# Patient Record
Sex: FEMALE | Race: WHITE | Employment: OTHER | URBAN - METROPOLITAN AREA
[De-identification: names, ages, dates, MRNs, and addresses within clinical notes are randomized per-mention and may not be internally consistent; named-entity substitution may affect disease eponyms.]

---

## 2022-08-13 ENCOUNTER — APPOINTMENT (OUTPATIENT)
Dept: CT IMAGING | Age: 67
DRG: 338 | End: 2022-08-13
Payer: MEDICARE

## 2022-08-13 ENCOUNTER — APPOINTMENT (OUTPATIENT)
Dept: GENERAL RADIOLOGY | Age: 67
DRG: 338 | End: 2022-08-13
Payer: MEDICARE

## 2022-08-13 ENCOUNTER — HOSPITAL ENCOUNTER (INPATIENT)
Age: 67
LOS: 6 days | Discharge: HOME | DRG: 338 | End: 2022-08-19
Attending: EMERGENCY MEDICINE | Admitting: SURGERY
Payer: MEDICARE

## 2022-08-13 DIAGNOSIS — K35.32 PERFORATED APPENDIX: Primary | ICD-10-CM

## 2022-08-13 DIAGNOSIS — K35.20 ACUTE APPENDICITIS WITH GENERALIZED PERITONITIS, UNSPECIFIED WHETHER ABSCESS PRESENT, UNSPECIFIED WHETHER GANGRENE PRESENT, UNSPECIFIED WHETHER PERFORATION PRESENT: ICD-10-CM

## 2022-08-13 DIAGNOSIS — R11.0 NAUSEA: ICD-10-CM

## 2022-08-13 DIAGNOSIS — K65.1 INTRA-ABDOMINAL ABSCESS (HCC): ICD-10-CM

## 2022-08-13 PROBLEM — R10.9 PAIN IN THE ABDOMEN: Status: ACTIVE | Noted: 2022-08-13

## 2022-08-13 LAB
ALBUMIN SERPL-MCNC: 4.2 GM/DL (ref 3.4–5)
ALP BLD-CCNC: 466 IU/L (ref 40–129)
ALT SERPL-CCNC: 99 U/L (ref 10–40)
ANION GAP SERPL CALCULATED.3IONS-SCNC: 13 MMOL/L (ref 4–16)
AST SERPL-CCNC: 92 IU/L (ref 15–37)
BASOPHILS ABSOLUTE: 0.1 K/CU MM
BASOPHILS RELATIVE PERCENT: 0.4 % (ref 0–1)
BILIRUB SERPL-MCNC: 0.6 MG/DL (ref 0–1)
BUN BLDV-MCNC: 15 MG/DL (ref 6–23)
CALCIUM SERPL-MCNC: 9.8 MG/DL (ref 8.3–10.6)
CHLORIDE BLD-SCNC: 98 MMOL/L (ref 99–110)
CO2: 27 MMOL/L (ref 21–32)
CREAT SERPL-MCNC: 0.9 MG/DL (ref 0.6–1.1)
D DIMER: 3437 NG/ML(DDU)
DIFFERENTIAL TYPE: ABNORMAL
EKG ATRIAL RATE: 107 BPM
EKG DIAGNOSIS: NORMAL
EKG P AXIS: 63 DEGREES
EKG P-R INTERVAL: 144 MS
EKG Q-T INTERVAL: 320 MS
EKG QRS DURATION: 84 MS
EKG QTC CALCULATION (BAZETT): 427 MS
EKG R AXIS: 53 DEGREES
EKG T AXIS: 33 DEGREES
EKG VENTRICULAR RATE: 107 BPM
EOSINOPHILS ABSOLUTE: 0 K/CU MM
EOSINOPHILS RELATIVE PERCENT: 0.2 % (ref 0–3)
GFR AFRICAN AMERICAN: >60 ML/MIN/1.73M2
GFR NON-AFRICAN AMERICAN: >60 ML/MIN/1.73M2
GLUCOSE BLD-MCNC: 123 MG/DL (ref 70–99)
HCT VFR BLD CALC: 45.1 % (ref 37–47)
HEMOGLOBIN: 14.6 GM/DL (ref 12.5–16)
IMMATURE NEUTROPHIL %: 0.5 % (ref 0–0.43)
LACTATE: 1.4 MMOL/L (ref 0.4–2)
LIPASE: 13 IU/L (ref 13–60)
LYMPHOCYTES ABSOLUTE: 1.2 K/CU MM
LYMPHOCYTES RELATIVE PERCENT: 7.7 % (ref 24–44)
MCH RBC QN AUTO: 30.8 PG (ref 27–31)
MCHC RBC AUTO-ENTMCNC: 32.4 % (ref 32–36)
MCV RBC AUTO: 95.1 FL (ref 78–100)
MONOCYTES ABSOLUTE: 1.4 K/CU MM
MONOCYTES RELATIVE PERCENT: 9.2 % (ref 0–4)
NUCLEATED RBC %: 0 %
PDW BLD-RTO: 13.6 % (ref 11.7–14.9)
PLATELET # BLD: 337 K/CU MM (ref 140–440)
PMV BLD AUTO: 9.8 FL (ref 7.5–11.1)
POTASSIUM SERPL-SCNC: 4.1 MMOL/L (ref 3.5–5.1)
RBC # BLD: 4.74 M/CU MM (ref 4.2–5.4)
SARS-COV-2, NAAT: NOT DETECTED
SEGMENTED NEUTROPHILS ABSOLUTE COUNT: 12.4 K/CU MM
SEGMENTED NEUTROPHILS RELATIVE PERCENT: 82 % (ref 36–66)
SODIUM BLD-SCNC: 138 MMOL/L (ref 135–145)
SOURCE: NORMAL
TOTAL IMMATURE NEUTOROPHIL: 0.07 K/CU MM
TOTAL NUCLEATED RBC: 0 K/CU MM
TOTAL PROTEIN: 7.7 GM/DL (ref 6.4–8.2)
TROPONIN T: <0.01 NG/ML
WBC # BLD: 15.1 K/CU MM (ref 4–10.5)

## 2022-08-13 PROCEDURE — 85025 COMPLETE CBC W/AUTO DIFF WBC: CPT

## 2022-08-13 PROCEDURE — 96375 TX/PRO/DX INJ NEW DRUG ADDON: CPT

## 2022-08-13 PROCEDURE — 87040 BLOOD CULTURE FOR BACTERIA: CPT

## 2022-08-13 PROCEDURE — 84484 ASSAY OF TROPONIN QUANT: CPT

## 2022-08-13 PROCEDURE — 1200000000 HC SEMI PRIVATE

## 2022-08-13 PROCEDURE — 71275 CT ANGIOGRAPHY CHEST: CPT

## 2022-08-13 PROCEDURE — 71046 X-RAY EXAM CHEST 2 VIEWS: CPT

## 2022-08-13 PROCEDURE — 2580000003 HC RX 258: Performed by: EMERGENCY MEDICINE

## 2022-08-13 PROCEDURE — 87150 DNA/RNA AMPLIFIED PROBE: CPT

## 2022-08-13 PROCEDURE — 6370000000 HC RX 637 (ALT 250 FOR IP): Performed by: NURSE PRACTITIONER

## 2022-08-13 PROCEDURE — 6360000002 HC RX W HCPCS: Performed by: EMERGENCY MEDICINE

## 2022-08-13 PROCEDURE — 93005 ELECTROCARDIOGRAM TRACING: CPT | Performed by: NURSE PRACTITIONER

## 2022-08-13 PROCEDURE — 87635 SARS-COV-2 COVID-19 AMP PRB: CPT

## 2022-08-13 PROCEDURE — 74177 CT ABD & PELVIS W/CONTRAST: CPT

## 2022-08-13 PROCEDURE — 80053 COMPREHEN METABOLIC PANEL: CPT

## 2022-08-13 PROCEDURE — C9113 INJ PANTOPRAZOLE SODIUM, VIA: HCPCS | Performed by: EMERGENCY MEDICINE

## 2022-08-13 PROCEDURE — 96374 THER/PROPH/DIAG INJ IV PUSH: CPT

## 2022-08-13 PROCEDURE — 83690 ASSAY OF LIPASE: CPT

## 2022-08-13 PROCEDURE — 83605 ASSAY OF LACTIC ACID: CPT

## 2022-08-13 PROCEDURE — 93010 ELECTROCARDIOGRAM REPORT: CPT | Performed by: INTERNAL MEDICINE

## 2022-08-13 PROCEDURE — 85379 FIBRIN DEGRADATION QUANT: CPT

## 2022-08-13 PROCEDURE — 6360000004 HC RX CONTRAST MEDICATION: Performed by: EMERGENCY MEDICINE

## 2022-08-13 PROCEDURE — 99285 EMERGENCY DEPT VISIT HI MDM: CPT

## 2022-08-13 PROCEDURE — 96361 HYDRATE IV INFUSION ADD-ON: CPT

## 2022-08-13 RX ORDER — ONDANSETRON 2 MG/ML
4 INJECTION INTRAMUSCULAR; INTRAVENOUS EVERY 6 HOURS PRN
Status: DISCONTINUED | OUTPATIENT
Start: 2022-08-13 | End: 2022-08-19 | Stop reason: HOSPADM

## 2022-08-13 RX ORDER — ASPIRIN 81 MG/1
324 TABLET, CHEWABLE ORAL ONCE
Status: COMPLETED | OUTPATIENT
Start: 2022-08-13 | End: 2022-08-13

## 2022-08-13 RX ORDER — ACETAMINOPHEN 325 MG/1
650 TABLET ORAL EVERY 4 HOURS PRN
Status: DISCONTINUED | OUTPATIENT
Start: 2022-08-13 | End: 2022-08-19 | Stop reason: HOSPADM

## 2022-08-13 RX ORDER — 0.9 % SODIUM CHLORIDE 0.9 %
1000 INTRAVENOUS SOLUTION INTRAVENOUS ONCE
Status: COMPLETED | OUTPATIENT
Start: 2022-08-13 | End: 2022-08-13

## 2022-08-13 RX ORDER — SODIUM CHLORIDE 0.9 % (FLUSH) 0.9 %
5-40 SYRINGE (ML) INJECTION EVERY 12 HOURS SCHEDULED
Status: DISCONTINUED | OUTPATIENT
Start: 2022-08-13 | End: 2022-08-19 | Stop reason: HOSPADM

## 2022-08-13 RX ORDER — PANTOPRAZOLE SODIUM 40 MG/10ML
40 INJECTION, POWDER, LYOPHILIZED, FOR SOLUTION INTRAVENOUS ONCE
Status: COMPLETED | OUTPATIENT
Start: 2022-08-13 | End: 2022-08-13

## 2022-08-13 RX ORDER — SODIUM CHLORIDE 9 MG/ML
INJECTION, SOLUTION INTRAVENOUS PRN
Status: DISCONTINUED | OUTPATIENT
Start: 2022-08-13 | End: 2022-08-19 | Stop reason: HOSPADM

## 2022-08-13 RX ORDER — MORPHINE SULFATE 4 MG/ML
4 INJECTION, SOLUTION INTRAMUSCULAR; INTRAVENOUS EVERY 4 HOURS PRN
Status: DISCONTINUED | OUTPATIENT
Start: 2022-08-13 | End: 2022-08-14

## 2022-08-13 RX ORDER — SODIUM CHLORIDE 0.9 % (FLUSH) 0.9 %
5-40 SYRINGE (ML) INJECTION PRN
Status: DISCONTINUED | OUTPATIENT
Start: 2022-08-13 | End: 2022-08-19 | Stop reason: HOSPADM

## 2022-08-13 RX ORDER — MORPHINE SULFATE 4 MG/ML
4 INJECTION, SOLUTION INTRAMUSCULAR; INTRAVENOUS ONCE
Status: DISCONTINUED | OUTPATIENT
Start: 2022-08-13 | End: 2022-08-13

## 2022-08-13 RX ORDER — ONDANSETRON 2 MG/ML
4 INJECTION INTRAMUSCULAR; INTRAVENOUS EVERY 30 MIN PRN
Status: DISCONTINUED | OUTPATIENT
Start: 2022-08-13 | End: 2022-08-13

## 2022-08-13 RX ORDER — SODIUM CHLORIDE, SODIUM LACTATE, POTASSIUM CHLORIDE, CALCIUM CHLORIDE 600; 310; 30; 20 MG/100ML; MG/100ML; MG/100ML; MG/100ML
INJECTION, SOLUTION INTRAVENOUS CONTINUOUS
Status: DISCONTINUED | OUTPATIENT
Start: 2022-08-13 | End: 2022-08-18

## 2022-08-13 RX ORDER — ONDANSETRON 4 MG/1
4 TABLET, ORALLY DISINTEGRATING ORAL EVERY 8 HOURS PRN
Status: DISCONTINUED | OUTPATIENT
Start: 2022-08-13 | End: 2022-08-19 | Stop reason: HOSPADM

## 2022-08-13 RX ADMIN — PIPERACILLIN AND TAZOBACTAM 3375 MG: 3; .375 INJECTION, POWDER, LYOPHILIZED, FOR SOLUTION INTRAVENOUS at 15:17

## 2022-08-13 RX ADMIN — SODIUM CHLORIDE 1000 ML: 9 INJECTION, SOLUTION INTRAVENOUS at 12:42

## 2022-08-13 RX ADMIN — IOPAMIDOL 80 ML: 755 INJECTION, SOLUTION INTRAVENOUS at 14:12

## 2022-08-13 RX ADMIN — ASPIRIN 81 MG CHEWABLE TABLET 324 MG: 81 TABLET CHEWABLE at 12:42

## 2022-08-13 RX ADMIN — ONDANSETRON 4 MG: 2 INJECTION INTRAMUSCULAR; INTRAVENOUS at 12:43

## 2022-08-13 RX ADMIN — PANTOPRAZOLE SODIUM 40 MG: 40 INJECTION, POWDER, FOR SOLUTION INTRAVENOUS at 12:43

## 2022-08-13 ASSESSMENT — ENCOUNTER SYMPTOMS
SORE THROAT: 0
STRIDOR: 0
EYE PAIN: 0
COUGH: 0
DIARRHEA: 0
RHINORRHEA: 0
APNEA: 0
BLOOD IN STOOL: 0
CONSTIPATION: 1
VOMITING: 0
CHEST TIGHTNESS: 0
ABDOMINAL PAIN: 1
NAUSEA: 1
FACIAL SWELLING: 0
SHORTNESS OF BREATH: 0
WHEEZING: 0
EYE DISCHARGE: 0
BACK PAIN: 0
COLOR CHANGE: 0
EYE REDNESS: 0
ABDOMINAL DISTENTION: 0

## 2022-08-13 ASSESSMENT — PAIN DESCRIPTION - FREQUENCY: FREQUENCY: CONTINUOUS

## 2022-08-13 ASSESSMENT — LIFESTYLE VARIABLES: HOW OFTEN DO YOU HAVE A DRINK CONTAINING ALCOHOL: NEVER

## 2022-08-13 ASSESSMENT — PAIN DESCRIPTION - ORIENTATION: ORIENTATION: LOWER;RIGHT

## 2022-08-13 ASSESSMENT — PAIN DESCRIPTION - LOCATION: LOCATION: ABDOMEN

## 2022-08-13 ASSESSMENT — PAIN SCALES - GENERAL: PAINLEVEL_OUTOF10: 4

## 2022-08-13 ASSESSMENT — PAIN DESCRIPTION - PAIN TYPE: TYPE: ACUTE PAIN

## 2022-08-13 NOTE — ED PROVIDER NOTES
I independently examined and evaluated Sherice Painter. In brief their history revealed patient states she has had a lot of nausea dry heaving and some abdominal pain. Recently saw her PCP who put her on Phenergan. States she took a couple doses but felt like she did not like the way it made her feel and did not help with her nausea. She is denying any chest pain, shortness of breath, productive cough. Denies any sick contacts. States she is having some difficulty having a bowel movement and also feels nauseated. She has had a previous total abdominal hysterectomy and lysis of adhesions due to a bowel obstruction in the past.  Denies any fevers, chills, urinary symptoms. Their exam revealed female who is awake, alert, oriented x4. Soft abdomen. Nondistended. No guarding or rebound. Mild mid abdominal tenderness. Clear lungs. Speaks in full sentences. No pitting edema. . All diagnostic, treatment, and disposition decisions were made by myself in conjunction with the mid-level provider. Before disposition, questions were sought and answered with the patient. They have voiced understanding and agree with the plan: Patient is normotensive, afebrile, sats 99% on room air. Patient is mildly tachycardic. Patient was started on IV fluids, Zofran, Protonix IV. EKG shows sinus tachycardia without any acute findings. CBC shows a white count of 15.1, hemoglobin of 14. 6. Electrolytes show mild elevation in LFTs with an ALT of 99, AST of 92, alk phos of 466. Normal anion gap of 13. Lipase normal at 13. Troponin normal.  D-dimer is elevated at 3437. Rapid covid negative. Chest x-ray shows no acute process. CTA chest no acute pulmonary emboli or pulmonary process. COPD. Shital Abbott CT abdomen pelvis right lower quadrant pelvic 8.2 cm complex air-fluid collection concerning for abscess. Focal right lower quadrant inflammatory changes.   This is concerning to represent an acute perforated appendicitis with secondary cecal and terminal ileal inflammation. No evidence of bowel obstruction. Patient started on morphine for pain, Zosyn IV. Lactic and cultures were added. General surgery will be contacted. Per chart review patient has seen Dr. Giacomo Ferrera in the past, will place call to his general surgery group. NP, [facility] spoke with Dr Garcia Kearney who will consult, possible IR drainage versus surgery, Dr Garcia Kearney to decide. He agrees with abx, admit to hospitalist. Annita Marquez with patient, she agrees to plan. For all further details of the patient's emergency department visit, please see the mid-level practitioner's documentation. The Ekg interpreted by me shows  sinus tachycardia, tkit=907  with a rate of 107  Axis is   Normal  QTc is  normal  Intervals and Durations are unremarkable.       ST Segments: no acute change  No old EKG           Kasia Franco MD  08/13/22 [id number]

## 2022-08-13 NOTE — ED NOTES
States yesterday she has right lower abdominal pain, after taking the Promethazine     Marguerite Mirtha  08/13/22 0239

## 2022-08-13 NOTE — ED NOTES
[id number] paged hospitalist     Amanda Ulrich  08/13/22 189 [address] returned call      Amanda Ulrich  08/13/22 9581

## 2022-08-13 NOTE — ED PROVIDER NOTES
[facility]  EMERGENCY DEPARTMENT ENCOUNTER        Pt Name: Davy Hou  MRN: [id number]  [place] 1955  Date of evaluation: 8/13/2022  Provider: PJ Keith - HARSH  PCP: Estrada García MD  Note Started: 12:17 PM EDT       I have seen and evaluated this patient with my supervising physician Dre Louise MD.      Triage CHIEF COMPLAINT       Chief Complaint   Patient presents with    Other     States has had indigestion for 1 week. States that her Dr gave her Promethazine on 8/10/2022, without relief    Abdominal Pain     Right lower abdominal pain         HISTORY OF PRESENT ILLNESS   (Location/Symptom, Timing/Onset, Context/Setting, Quality, Duration, Modifying Factors, Severity)  Note limiting factors. Chief Complaint: Abdominal pain/fullness    Davy Hou is a 69 y.o. female comes to the emergency department with vague symptoms including abdominal fullness that mostly is in the epigastric/suprapubic, supraumbilical aspect of the abdomen. Is been ongoing for several days. She was seen by her primary care provider and was treated with promethazine however this has not helped her symptoms and just serve to make her tired and grouchy. She also reports that she has had a mild intermittent headache. There is associated symptoms include dysuria, anorexia, general malaise. She reports the pain currently is a 5 out of 10. She reports that she is generally just been feeling miserable. Patient is passing gas. She admits that its been several days since she had a bowel movement however has not been eating very much either. Denies any relieving or exacerbating factors. Nursing Notes were all reviewed and agreed with or any disagreements were addressed in the HPI. REVIEW OF SYSTEMS    (2-9 systems for level 4, 10 or more for level 5)     Review of Systems   Constitutional:  Negative for appetite change, chills, fatigue, fever and unexpected weight change.    HENT: Negative for congestion, ear pain, facial swelling, hearing loss, rhinorrhea and sore throat. Eyes:  Negative for pain, discharge, redness and visual disturbance. Respiratory:  Negative for apnea, cough, chest tightness, shortness of breath, wheezing and stridor. Cardiovascular:  Negative for chest pain, palpitations and leg swelling. Gastrointestinal:  Positive for abdominal pain, constipation and nausea. Negative for abdominal distention, blood in stool, diarrhea and vomiting. Endocrine: Negative for cold intolerance and heat intolerance. Genitourinary:  Positive for difficulty urinating. Negative for decreased urine volume, dysuria, flank pain, frequency, hematuria, urgency, vaginal bleeding and vaginal discharge. Musculoskeletal:  Negative for back pain, gait problem, joint swelling, myalgias and neck pain. Skin:  Negative for color change, pallor and rash. Neurological:  Negative for dizziness, syncope, speech difficulty and headaches. Psychiatric/Behavioral:  Negative for behavioral problems and confusion. PAST MEDICAL HISTORY     Past Medical History:   Diagnosis Date    Chronic venous hypertension with ulcer and inflammation ([facility] [address] 1/29/2013       SURGICAL HISTORY     Past Surgical History:   Procedure Laterality Date    COLONOSCOPY  2003    HYSTERECTOMY (CERVIX STATUS UNKNOWN)  2007    TONSILLECTOMY         CURRENTMEDICATIONS       Current Discharge Medication List        CONTINUE these medications which have NOT CHANGED    Details   ondansetron (ZOFRAN ODT) 4 MG disintegrating tablet Take 1 tablet by mouth every 8 hours as needed for Nausea  Qty: 15 tablet, Refills: 0      aspirin 325 MG tablet Take 325 mg by mouth daily.  Indications: EXCEDRINE PRN PAIN             ALLERGIES     Septra [sulfamethoxazole-trimethoprim]    FAMILYHISTORY       Family History   Problem Relation Age of Onset    Cancer Father         SOCIAL HISTORY       Social History     Socioeconomic History Marital status:      Spouse name: Shona Jensen    Number of children: 1    Years of education: 13    Highest education level: None   Tobacco Use    Smoking status: Never    Smokeless tobacco: Never   Vaping Use    Vaping Use: Never used   Substance and Sexual Activity    Alcohol use: No    Drug use: No       SCREENINGS    Art Coma Scale  Eye Opening: Spontaneous  Best Verbal Response: Oriented  Best Motor Response: Obeys commands  Art Coma Scale Score: 15        PHYSICAL EXAM    (up to 7 for level 4, 8 or more for level 5)     ED Triage Vitals [08/13/22 1141]   BP Temp Temp Source Heart Rate Resp SpO2 Height Weight   (!) 119/95 98.6 °F (37 °C) Oral (!) 122 16 98 % 5' 11\" (1.803 m) 202 lb (91.6 kg)       Physical Exam  Vitals and nursing note reviewed. Constitutional:       General: She is not in acute distress. Appearance: Normal appearance. She is well-developed. She is not ill-appearing or toxic-appearing. HENT:      Head: Normocephalic and atraumatic. Nose: Nose normal.      Mouth/Throat:      Mouth: Mucous membranes are moist.      Pharynx: No oropharyngeal exudate or posterior oropharyngeal erythema. Eyes:      Extraocular Movements: Extraocular movements intact. Right eye: No nystagmus. Left eye: No nystagmus. Pupils: Pupils are equal, round, and reactive to light. Cardiovascular:      Rate and Rhythm: Tachycardia present. Pulmonary:      Effort: Pulmonary effort is normal. No respiratory distress. Breath sounds: Normal breath sounds. No stridor. No wheezing or rales. Chest:      Chest wall: No tenderness. Abdominal:      General: Abdomen is flat. There is no distension. Palpations: Abdomen is soft. There is no pulsatile mass. Tenderness: There is generalized abdominal tenderness and tenderness in the epigastric area and suprapubic area. There is guarding (mild guarding). There is no right CVA tenderness, left CVA tenderness or rebound. Contrast? None   Final Result   1. No acute pulmonary emboli or pulmonary process. COPD. 2. Right lower quadrant/hemipelvic 8.2 cm complex air-fluid collection   concerning for abscess. Focal right lower quadrant inflammatory changes. This is suspected to represent acute perforated appendicitis with secondary   cecal and terminal ileal inflammation. 3. No evidence of bowel obstruction. Critical results were called by Dr. Martha Mancia. [facility]   on 8/13/2022 at 14:33. CTA PULMONARY W CONTRAST   Final Result   1. No acute pulmonary emboli or pulmonary process. COPD. 2. Right lower quadrant/hemipelvic 8.2 cm complex air-fluid collection   concerning for abscess. Focal right lower quadrant inflammatory changes. This is suspected to represent acute perforated appendicitis with secondary   cecal and terminal ileal inflammation. 3. No evidence of bowel obstruction. Critical results were called by Dr. Martha Mancia. Viki An[facility]   on 8/13/2022 at 14:33. XR CHEST (2 VW)   Final Result   No acute process. No results found. PROCEDURES   Unless otherwise noted below, none     Procedures    CRITICAL CARE TIME     Not applicable. CONSULTS:  IP CONSULT TO GENERAL SURGERY  IP CONSULT TO HOSPITALIST      EMERGENCY DEPARTMENT COURSE and DIFFERENTIAL DIAGNOSIS/MDM:   Vitals:    Vitals:    08/13/22 1430 08/13/22 1448 08/13/22 1858 08/13/22 1951   BP:   134/82    Pulse: 82 89 89    Resp: 21 17 19    Temp:       TempSrc:       SpO2: 97% 99% 96%    Weight:    202 lb 13.2 oz (92 kg)   Height:    5' 11\" (1.803 m)       Is this patient to be included in the SEP-1 Core Measure due to severe sepsis or septic shock? No    This is an alert and oriented x4, 69 y.o. yo female who presents emergency department with midline abd pain. Patient has easy nonlabored respirations. Vital signs notable for tachycardia. Other vital signs within normal parameters. Patient is afebrile and in no acute distress. PERRL. Skin warm pink however she is diaphoretic. Abdomen is soft. There is mild guarding throughout however no rebound. full assessment noted above. EKG obtained. Shows sinus tachycardia. No acute changes noted. Labs notable for elevated D-dimer. Labs unremarkable. CTA is obtained indicates a 8.2 cm abscess likely secondary to perforated appendix. IV antibiotics initiated in the emergency department. Patient was treated for discomfort and nausea. The case was reviewed with Dr. Mayra Dubois. He evaluates the patient and will hospitalize for continuation of care.     Patient was given thefollowing medications:  Medications   piperacillin-tazobactam (ZOSYN) 3,375 mg in dextrose 5 % 50 mL IVPB (mini-bag) (has no administration in time range)   morphine sulfate (PF) injection 4 mg (has no administration in time range)   acetaminophen (TYLENOL) tablet 650 mg (has no administration in time range)   ondansetron (ZOFRAN) injection 4 mg (has no administration in time range)   lactated ringers infusion (has no administration in time range)   sodium chloride flush 0.9 % injection 5-40 mL (has no administration in time range)   sodium chloride flush 0.9 % injection 5-40 mL (has no administration in time range)   0.9 % sodium chloride infusion (has no administration in time range)   acetaminophen (TYLENOL) tablet 650 mg (has no administration in time range)   ondansetron (ZOFRAN-ODT) disintegrating tablet 4 mg (has no administration in time range)     Or   ondansetron (ZOFRAN) injection 4 mg (has no administration in time range)   aspirin chewable tablet 324 mg (324 mg Oral Given 8/13/22 1242)   0.9 % sodium chloride bolus (0 mLs IntraVENous Stopped 8/13/22 1342)   pantoprazole (PROTONIX) injection 40 mg (40 mg IntraVENous Given 8/13/22 1243)   iopamidol (ISOVUE-370) 76 % injection 80 mL (80 mLs IntraVENous Given 8/13/22 1412)   piperacillin-tazobactam (ZOSYN) 3,375 mg in dextrose 5 % 50 mL IVPB (mini-bag) (3,375 mg IntraVENous New Bag 8/13/22 3227)            FINAL IMPRESSION      1. Perforated appendix    2. Intra-abdominal abscess ([address]    3. Nausea          DISPOSITION/PLAN   DISPOSITION Admitted 08/13/2022 06:52:25 PM      PATIENT REFERREDTO:  No follow-up provider specified. DISCHARGE MEDICATIONS:  Current Discharge Medication List          DISCONTINUED MEDICATIONS:  Current Discharge Medication List            Comment: Please note this report has been produced using speech recognition software and may contain errors related to that system including errors in grammar, punctuation, and spelling, as well as words and phrases that may be inappropriate. If there are any questions or concerns please feel free to contact the dictating provider for clarification.     PJ Keith CNP (electronically signed)            PJ Keith CNP  08/13/22 0243

## 2022-08-13 NOTE — H&P
History and Physical    Patient's Name/Date of Birth: Jeniffer Hope / 1955 (42 y.o.)    Date: August 13, 2022     Chief Complaint: abdominal pain    HPI:  70 yo female with 1 week of lower abdominal pain Came to ER with persistent symptoms and was found to have probable perforated appendicitis with  phlegmon abscess. Patient is not septic and is otherwise clinically better than would expect given CT findings. No fever chills. Has anorexia. No obstructive symptoms    Past Medical History:   Diagnosis Date    Chronic venous hypertension with ulcer and inflammation ([facility] [address] 1/29/2013       Past Surgical History:   Procedure Laterality Date    COLONOSCOPY  2003    HYSTERECTOMY (CERVIX STATUS UNKNOWN)  2007    TONSILLECTOMY         Current Facility-Administered Medications   Medication Dose Route Frequency Provider Last Rate Last Admin    ondansetron (ZOFRAN) injection 4 mg  4 mg IntraVENous Q30 Min PRN Porter Johnson MD   4 mg at 08/13/22 1243    morphine sulfate (PF) injection 4 mg  4 mg IntraVENous Once Porter Johnson MD         Current Outpatient Medications   Medication Sig Dispense Refill    ondansetron (ZOFRAN ODT) 4 MG disintegrating tablet Take 1 tablet by mouth every 8 hours as needed for Nausea 15 tablet 0    aspirin 325 MG tablet Take 325 mg by mouth daily.  Indications: EXCEDRINE PRN PAIN         Allergies   Allergen Reactions    Septra [Sulfamethoxazole-Trimethoprim]        Family History   Problem Relation Age of Onset    Cancer Father        Social History     Socioeconomic History    Marital status:      Spouse name: Not on file    Number of children: Not on file    Years of education: Not on file    Highest education level: Not on file   Occupational History    Not on file   Tobacco Use    Smoking status: Never    Smokeless tobacco: Never   Vaping Use    Vaping Use: Never used   Substance and Sexual Activity    Alcohol use: No    Drug use: No    Sexual activity: Not on file   Other Topics Concern    Not on file   Social History Narrative    Not on file     Social Determinants of Health     Financial Resource Strain: Not on file   Food Insecurity: Not on file   Transportation Needs: Not on file   Physical Activity: Not on file   Stress: Not on file   Social Connections: Not on file   Intimate Partner Violence: Not on file   Housing Stability: Not on file       ROS: Non-contributory    Physical Exam:  Vitals:    08/13/22 1430   BP:    Pulse: 82   Resp: 21   Temp:    SpO2: 97%       Mental Status: Alert and Oriented    Chest: Breath sounds were clear and equal with no rales, wheezes, or rhonchi. Respiratory effort was normal with no retractions or use of accessory muscles. Cardiovascular: Heart sounds were normal with a regular rate and rhythm. There were no murmurs or gallops. Abdomen: Bowel sounds were normal.  The abdomen was soft and non distended. There was right lower abdominal tenderness,  no guarding, rebound, or rigidity. There was no masses, hepatosplenomegaly, or hernias. Lower midline scar    Genitourinary: No inguinal hernias were noted on coughing and straining.     Labs   Bmp:    Lab Results   Component Value Date/Time     08/13/2022 12:28 PM    K 4.1 08/13/2022 12:28 PM    CL 98 08/13/2022 12:28 PM    CO2 27 08/13/2022 12:28 PM    BUN 15 08/13/2022 12:28 PM    CREATININE 0.9 08/13/2022 12:28 PM    CALCIUM 9.8 08/13/2022 12:28 PM     CBC:    Lab Results   Component Value Date/Time    WBC 15.1 08/13/2022 12:28 PM    RBC 4.74 08/13/2022 12:28 PM    HGB 14.6 08/13/2022 12:28 PM    HCT 45.1 08/13/2022 12:28 PM    MCV 95.1 08/13/2022 12:28 PM    MCH 30.8 08/13/2022 12:28 PM    MCHC 32.4 08/13/2022 12:28 PM    RDW 13.6 08/13/2022 12:28 PM     08/13/2022 12:28 PM    MPV 9.8 08/13/2022 12:28 PM            Assessment/Plan:  Probable perforated appendicitis with complex abscess  Admit  IVF IV antibiotics  Due to complexity of abscess and phlegmon recommend lapparoscopic drainage and appendectomy if able tomorrow  Plan of care discussed with patient  Statement Of Medical Necessity:  Surgical Intervention required to alleviate patients symptoms as described above. Electronically by Irena Finney MD  on 8/13/2022 at 4:32 PM     I have fully discussed the plan of treatment and answered all questions for the patient.

## 2022-08-13 NOTE — ED NOTES
[address]ar repaged Dr Shabbir Khan covering Dr Mago Heaton  08/13/22 1453 [id number] Dr Shabbir Khan returned call      Oneil Deng  08/13/22 1505

## 2022-08-13 NOTE — PROGRESS NOTES
Patient arrived to [phone], via cart, with  at side. PIV Saline locked. Reviewed orders. Anticipated surgery, in morning. Reviewing Hepa cleanse bath tonight, before midnight, and in morning, and avoiding face and groin area. Patient verbalized understanding. Oriented to staff, electronics, VS times, hourly rounds, fall prevention, prn vs scheduled medications, diet, NPO after midnight, IV antibiotics, PIV indication, and issues to report to staff. Patient verbalized understanding, and demonstrated proper use of call light. Telemetry ordered, explained indication, and replacing leads, after getting wet/soiled. Bed in lowest position,  socks on.

## 2022-08-14 ENCOUNTER — ANESTHESIA EVENT (OUTPATIENT)
Dept: OPERATING ROOM | Age: 67
DRG: 338 | End: 2022-08-14
Payer: MEDICARE

## 2022-08-14 ENCOUNTER — ANESTHESIA (OUTPATIENT)
Dept: OPERATING ROOM | Age: 67
DRG: 338 | End: 2022-08-14
Payer: MEDICARE

## 2022-08-14 PROCEDURE — 2720000010 HC SURG SUPPLY STERILE: Performed by: SURGERY

## 2022-08-14 PROCEDURE — 6360000002 HC RX W HCPCS

## 2022-08-14 PROCEDURE — 2580000003 HC RX 258: Performed by: SURGERY

## 2022-08-14 PROCEDURE — 7100000001 HC PACU RECOVERY - ADDTL 15 MIN: Performed by: SURGERY

## 2022-08-14 PROCEDURE — 3700000001 HC ADD 15 MINUTES (ANESTHESIA): Performed by: SURGERY

## 2022-08-14 PROCEDURE — 6370000000 HC RX 637 (ALT 250 FOR IP): Performed by: SURGERY

## 2022-08-14 PROCEDURE — 94761 N-INVAS EAR/PLS OXIMETRY MLT: CPT

## 2022-08-14 PROCEDURE — 3700000000 HC ANESTHESIA ATTENDED CARE: Performed by: SURGERY

## 2022-08-14 PROCEDURE — 1200000000 HC SEMI PRIVATE

## 2022-08-14 PROCEDURE — 0DBJ4ZZ EXCISION OF APPENDIX, PERCUTANEOUS ENDOSCOPIC APPROACH: ICD-10-PCS | Performed by: SURGERY

## 2022-08-14 PROCEDURE — 6360000002 HC RX W HCPCS: Performed by: SURGERY

## 2022-08-14 PROCEDURE — 2709999900 HC NON-CHARGEABLE SUPPLY: Performed by: SURGERY

## 2022-08-14 PROCEDURE — 88304 TISSUE EXAM BY PATHOLOGIST: CPT | Performed by: PATHOLOGY

## 2022-08-14 PROCEDURE — 7100000000 HC PACU RECOVERY - FIRST 15 MIN: Performed by: SURGERY

## 2022-08-14 PROCEDURE — 3600000004 HC SURGERY LEVEL 4 BASE: Performed by: SURGERY

## 2022-08-14 PROCEDURE — 2500000003 HC RX 250 WO HCPCS: Performed by: SURGERY

## 2022-08-14 PROCEDURE — 3600000014 HC SURGERY LEVEL 4 ADDTL 15MIN: Performed by: SURGERY

## 2022-08-14 PROCEDURE — 2500000003 HC RX 250 WO HCPCS

## 2022-08-14 RX ORDER — LIDOCAINE HYDROCHLORIDE 20 MG/ML
INJECTION, SOLUTION INTRAVENOUS PRN
Status: DISCONTINUED | OUTPATIENT
Start: 2022-08-14 | End: 2022-08-14 | Stop reason: SDUPTHER

## 2022-08-14 RX ORDER — ENOXAPARIN SODIUM 100 MG/ML
40 INJECTION SUBCUTANEOUS DAILY
Status: DISCONTINUED | OUTPATIENT
Start: 2022-08-15 | End: 2022-08-19 | Stop reason: HOSPADM

## 2022-08-14 RX ORDER — KETOROLAC TROMETHAMINE 30 MG/ML
15 INJECTION, SOLUTION INTRAMUSCULAR; INTRAVENOUS EVERY 6 HOURS PRN
Status: DISCONTINUED | OUTPATIENT
Start: 2022-08-14 | End: 2022-08-16

## 2022-08-14 RX ORDER — PROPOFOL 10 MG/ML
INJECTION, EMULSION INTRAVENOUS PRN
Status: DISCONTINUED | OUTPATIENT
Start: 2022-08-14 | End: 2022-08-14 | Stop reason: SDUPTHER

## 2022-08-14 RX ORDER — BUPIVACAINE HYDROCHLORIDE 5 MG/ML
INJECTION, SOLUTION EPIDURAL; INTRACAUDAL
Status: COMPLETED | OUTPATIENT
Start: 2022-08-14 | End: 2022-08-14

## 2022-08-14 RX ORDER — PROCHLORPERAZINE EDISYLATE 5 MG/ML
5 INJECTION INTRAMUSCULAR; INTRAVENOUS
Status: DISCONTINUED | OUTPATIENT
Start: 2022-08-14 | End: 2022-08-14 | Stop reason: HOSPADM

## 2022-08-14 RX ORDER — MEPERIDINE HYDROCHLORIDE 25 MG/ML
6.25 INJECTION INTRAMUSCULAR; INTRAVENOUS; SUBCUTANEOUS EVERY 5 MIN PRN
Status: DISCONTINUED | OUTPATIENT
Start: 2022-08-14 | End: 2022-08-14 | Stop reason: HOSPADM

## 2022-08-14 RX ORDER — MORPHINE SULFATE 4 MG/ML
4 INJECTION, SOLUTION INTRAMUSCULAR; INTRAVENOUS
Status: DISCONTINUED | OUTPATIENT
Start: 2022-08-14 | End: 2022-08-19 | Stop reason: HOSPADM

## 2022-08-14 RX ORDER — HALOPERIDOL 5 MG/ML
1 INJECTION INTRAMUSCULAR
Status: DISCONTINUED | OUTPATIENT
Start: 2022-08-14 | End: 2022-08-14 | Stop reason: HOSPADM

## 2022-08-14 RX ORDER — IPRATROPIUM BROMIDE AND ALBUTEROL SULFATE 2.5; .5 MG/3ML; MG/3ML
1 SOLUTION RESPIRATORY (INHALATION)
Status: DISCONTINUED | OUTPATIENT
Start: 2022-08-14 | End: 2022-08-14 | Stop reason: HOSPADM

## 2022-08-14 RX ORDER — MIDAZOLAM HYDROCHLORIDE 2 MG/2ML
2 INJECTION, SOLUTION INTRAMUSCULAR; INTRAVENOUS
Status: DISCONTINUED | OUTPATIENT
Start: 2022-08-14 | End: 2022-08-14 | Stop reason: HOSPADM

## 2022-08-14 RX ORDER — OXYCODONE HYDROCHLORIDE 5 MG/1
5 TABLET ORAL
Status: DISCONTINUED | OUTPATIENT
Start: 2022-08-14 | End: 2022-08-14 | Stop reason: HOSPADM

## 2022-08-14 RX ORDER — FENTANYL CITRATE 50 UG/ML
INJECTION, SOLUTION INTRAMUSCULAR; INTRAVENOUS PRN
Status: DISCONTINUED | OUTPATIENT
Start: 2022-08-14 | End: 2022-08-14 | Stop reason: SDUPTHER

## 2022-08-14 RX ORDER — HYDRALAZINE HYDROCHLORIDE 20 MG/ML
10 INJECTION INTRAMUSCULAR; INTRAVENOUS
Status: DISCONTINUED | OUTPATIENT
Start: 2022-08-14 | End: 2022-08-14 | Stop reason: HOSPADM

## 2022-08-14 RX ORDER — FENTANYL CITRATE 50 UG/ML
50 INJECTION, SOLUTION INTRAMUSCULAR; INTRAVENOUS EVERY 5 MIN PRN
Status: DISCONTINUED | OUTPATIENT
Start: 2022-08-14 | End: 2022-08-14 | Stop reason: HOSPADM

## 2022-08-14 RX ORDER — ROCURONIUM BROMIDE 10 MG/ML
INJECTION, SOLUTION INTRAVENOUS PRN
Status: DISCONTINUED | OUTPATIENT
Start: 2022-08-14 | End: 2022-08-14 | Stop reason: SDUPTHER

## 2022-08-14 RX ORDER — DIPHENHYDRAMINE HYDROCHLORIDE 50 MG/ML
12.5 INJECTION INTRAMUSCULAR; INTRAVENOUS
Status: DISCONTINUED | OUTPATIENT
Start: 2022-08-14 | End: 2022-08-14 | Stop reason: HOSPADM

## 2022-08-14 RX ORDER — KETOROLAC TROMETHAMINE 30 MG/ML
INJECTION, SOLUTION INTRAMUSCULAR; INTRAVENOUS PRN
Status: DISCONTINUED | OUTPATIENT
Start: 2022-08-14 | End: 2022-08-14 | Stop reason: SDUPTHER

## 2022-08-14 RX ORDER — ONDANSETRON 2 MG/ML
INJECTION INTRAMUSCULAR; INTRAVENOUS PRN
Status: DISCONTINUED | OUTPATIENT
Start: 2022-08-14 | End: 2022-08-14 | Stop reason: SDUPTHER

## 2022-08-14 RX ORDER — LABETALOL HYDROCHLORIDE 5 MG/ML
10 INJECTION, SOLUTION INTRAVENOUS
Status: DISCONTINUED | OUTPATIENT
Start: 2022-08-14 | End: 2022-08-14 | Stop reason: HOSPADM

## 2022-08-14 RX ORDER — DEXAMETHASONE SODIUM PHOSPHATE 4 MG/ML
INJECTION, SOLUTION INTRA-ARTICULAR; INTRALESIONAL; INTRAMUSCULAR; INTRAVENOUS; SOFT TISSUE PRN
Status: DISCONTINUED | OUTPATIENT
Start: 2022-08-14 | End: 2022-08-14 | Stop reason: SDUPTHER

## 2022-08-14 RX ADMIN — PIPERACILLIN AND TAZOBACTAM 3375 MG: 3; .375 INJECTION, POWDER, LYOPHILIZED, FOR SOLUTION INTRAVENOUS at 00:38

## 2022-08-14 RX ADMIN — ACETAMINOPHEN 650 MG: 325 TABLET ORAL at 17:47

## 2022-08-14 RX ADMIN — PIPERACILLIN AND TAZOBACTAM 3375 MG: 3; .375 INJECTION, POWDER, LYOPHILIZED, FOR SOLUTION INTRAVENOUS at 23:24

## 2022-08-14 RX ADMIN — SODIUM CHLORIDE, POTASSIUM CHLORIDE, SODIUM LACTATE AND CALCIUM CHLORIDE: 600; 310; 30; 20 INJECTION, SOLUTION INTRAVENOUS at 09:45

## 2022-08-14 RX ADMIN — HYDROMORPHONE HYDROCHLORIDE 0.5 MG: 1 INJECTION, SOLUTION INTRAMUSCULAR; INTRAVENOUS; SUBCUTANEOUS at 08:23

## 2022-08-14 RX ADMIN — PIPERACILLIN AND TAZOBACTAM 3375 MG: 3; .375 INJECTION, POWDER, LYOPHILIZED, FOR SOLUTION INTRAVENOUS at 17:54

## 2022-08-14 RX ADMIN — ONDANSETRON 4 MG: 2 INJECTION INTRAMUSCULAR; INTRAVENOUS at 08:08

## 2022-08-14 RX ADMIN — ROCURONIUM BROMIDE 50 MG: 10 INJECTION, SOLUTION INTRAVENOUS at 07:46

## 2022-08-14 RX ADMIN — PIPERACILLIN AND TAZOBACTAM 3375 MG: 3; .375 INJECTION, POWDER, LYOPHILIZED, FOR SOLUTION INTRAVENOUS at 11:35

## 2022-08-14 RX ADMIN — DEXAMETHASONE SODIUM PHOSPHATE 8 MG: 4 INJECTION, SOLUTION INTRAMUSCULAR; INTRAVENOUS at 07:49

## 2022-08-14 RX ADMIN — ACETAMINOPHEN 650 MG: 325 TABLET ORAL at 05:23

## 2022-08-14 RX ADMIN — FENTANYL CITRATE 100 MCG: 50 INJECTION, SOLUTION INTRAMUSCULAR; INTRAVENOUS at 07:44

## 2022-08-14 RX ADMIN — PROPOFOL 120 MG: 10 INJECTION, EMULSION INTRAVENOUS at 07:44

## 2022-08-14 RX ADMIN — KETOROLAC TROMETHAMINE 30 MG: 30 INJECTION, SOLUTION INTRAMUSCULAR; INTRAVENOUS at 08:52

## 2022-08-14 RX ADMIN — SODIUM CHLORIDE, POTASSIUM CHLORIDE, SODIUM LACTATE AND CALCIUM CHLORIDE: 600; 310; 30; 20 INJECTION, SOLUTION INTRAVENOUS at 00:36

## 2022-08-14 RX ADMIN — SODIUM CHLORIDE, PRESERVATIVE FREE 10 ML: 5 INJECTION INTRAVENOUS at 00:38

## 2022-08-14 RX ADMIN — PIPERACILLIN AND TAZOBACTAM 3375 MG: 3; .375 INJECTION, POWDER, LYOPHILIZED, FOR SOLUTION INTRAVENOUS at 05:25

## 2022-08-14 RX ADMIN — SUGAMMADEX 200 MG: 100 INJECTION, SOLUTION INTRAVENOUS at 09:01

## 2022-08-14 RX ADMIN — LIDOCAINE HYDROCHLORIDE 100 MG: 20 INJECTION, SOLUTION INTRAVENOUS at 07:44

## 2022-08-14 ASSESSMENT — PAIN DESCRIPTION - ORIENTATION
ORIENTATION: ANTERIOR
ORIENTATION: MID

## 2022-08-14 ASSESSMENT — PAIN SCALES - GENERAL
PAINLEVEL_OUTOF10: 5
PAINLEVEL_OUTOF10: 5
PAINLEVEL_OUTOF10: 0
PAINLEVEL_OUTOF10: 0

## 2022-08-14 ASSESSMENT — PAIN DESCRIPTION - LOCATION
LOCATION: HEAD
LOCATION: ABDOMEN;HEAD

## 2022-08-14 ASSESSMENT — PAIN DESCRIPTION - DESCRIPTORS
DESCRIPTORS: ACHING
DESCRIPTORS: ACHING;CRAMPING

## 2022-08-14 ASSESSMENT — PAIN DESCRIPTION - ONSET: ONSET: PROGRESSIVE

## 2022-08-14 ASSESSMENT — PAIN - FUNCTIONAL ASSESSMENT: PAIN_FUNCTIONAL_ASSESSMENT: ACTIVITIES ARE NOT PREVENTED

## 2022-08-14 ASSESSMENT — PAIN DESCRIPTION - PAIN TYPE: TYPE: ACUTE PAIN

## 2022-08-14 ASSESSMENT — PAIN DESCRIPTION - FREQUENCY: FREQUENCY: INTERMITTENT

## 2022-08-14 NOTE — OP NOTE
[address] [place] [address], [address] [place] [address]                                OPERATIVE REPORT    PATIENT NAME: Yanique Jeong                :        1955  MED REC NO:   [id number]                          ROOM:  ACCOUNT NO:   [de-identified]                           ADMIT DATE: 2022  PROVIDER:     Pricila Pyle MD    DATE OF PROCEDURE:  2022    PREOPERATIVE DIAGNOSIS:  Intra-abdominal abscess secondary to perforated  appendicitis. POSTOPERATIVE DIAGNOSIS:  Intra-abdominal abscess secondary to  perforated appendicitis. OPERATION PERFORMED:  Laparoscopic drainage of abdominal abscess and  partial appendectomy. OPERATING SURGEON:  Pricila Pyle MD    ANESTHESIA:  General anesthesia plus 30 mL of Marcaine used locally. BLOOD LOSS:  20 mL. DRAINS:  15-[place] Micha-Dugan drain placed in the pelvis and  pericecal space. COMPLICATIONS:  None. DETAILS OF THE PROCEDURE:  The patient brought to the operating room. Preoperative antibiotics were administered. General anesthetic was  administered and the abdomen was prepped and draped in the normal  sterile fashion. Due to a previous midline incision, I made an incision  in the left upper quadrant, inserted the Veress needle and established  pneumoperitoneum. A five port was placed in this incision. There was  small bowel stuck to the anterior abdominal wall from previous incision. I was able to place two other five ports in the left lateral abdomen and  left lower quadrant; through these incisions, a sharp dissection to take  down the adhesions of the bowel to allow me to visualize the area of  concern. The patient had a dense inflammatory mass in the pericecal  area, I used scissors to cut adhesions and used blunt dissection to  identify the distal ileum and the cecum.   The inflammatory mesentery was  appreciated and developed a plane underneath the mesentery where this  terminal ileum enter the cecum and then broke into an abscess cavity. This pus was aspirated. I incised the peritoneum along the cecum and  mobilized the cecum from the inferior left side and in the retrocecal  space, I again broke into another loculated collection of pus and  identified what appeared to be the perforated appendix. This was  elevated and using an Enseal device. I divided some attachments and  also used this to divide what appeared to be appendiceal artery, which  was not bleeding. Given that the appendix had ruptured, fragmental  pieces of the appendix were removed and submitted to Pathology, but  given that the abscess was adequately drained, the appendix was  perforated. Further dissection close to the cecum was not performed to  prevent any cecal injury. Once I had irrigated and adequately drained  the abscess, I placed a closed suction drain in the pelvis and in the  pericecal space and secured this at the skin at the inferior left lower  quadrant port site. Again, I checked hemostasis carefully and then I  anesthetized the port sites, released the pneumoperitoneum, removed the  ports, closed the wounds with Prolene suture. Sterile dressings  applied. Returned to recovery in satisfactory condition.         Joyce Mercado MD    D: 08/14/2022 9:03:00       T: 08/14/2022 9:05:22     [id number]  Job#: [id number]     Doc#: [id number]    CC:  Breanna Johnson MD

## 2022-08-14 NOTE — PROGRESS NOTES
Patient seen and examined for surgery  Consent obtained for abscess drainage and possible appendectomy

## 2022-08-14 NOTE — BRIEF OP NOTE
Brief Postoperative Note      Patient: Jeniffer Hope  YOB: 1955  MRN: [id number]    Date of Procedure: 8/14/2022    Pre-Op Diagnosis: Acute appendicitis with generalized peritonitis, unspecified whether abscess present, unspecified whether gangrene present, unspecified whether perforation present [K35.20]    Post-Op Diagnosis:  perforated appendicitis with abscess       Procedure(s):  APPENDECTOMY LAPAROSCOPIC, LAPAROSCOPIC DRAIN OF ABSCESS    Surgeon(s):  Linda Good MD    Assistant:  * No surgical staff found *    Anesthesia: General    Estimated Blood Loss (mL): less than 50     Complications: None    Specimens:   ID Type Source Tests Collected by Time Destination   A : PERFORATED APPENDIX Tissue Appendix [address] [place] Linda Good MD 8/14/2022 4056        Implants:  * No implants in log *      Drains:   Urinary Catheter Pope (Active)       Findings: see note    Electronically signed by Linda Good MD on 8/14/2022 at 8:55 AM

## 2022-08-14 NOTE — ANESTHESIA POSTPROCEDURE EVALUATION
Department of Anesthesiology  Postprocedure Note    Patient: Joi Rangel  MRN: [id number]  YOB: 1955  Date of evaluation: 8/14/2022      Procedure Summary     Date: 08/14/22 Room / Location: [address]    Anesthesia Start: 6388 Anesthesia Stop: 1548    Procedure: APPENDECTOMY LAPAROSCOPIC, LAPAROSCOPIC DRAIN OF ABSCESS, DRAIN PLACEMENT (Abdomen) Diagnosis:       Acute appendicitis with generalized peritonitis, unspecified whether abscess present, unspecified whether gangrene present, unspecified whether perforation present      (Acute appendicitis with generalized peritonitis, unspecified whether abscess present, unspecified whether gangrene present, unspecified whether perforation present [K35.20])    Surgeons: Jose Benites MD Responsible Provider: Sherry Lewis DO    Anesthesia Type: general ASA Status: 2          Anesthesia Type: No value filed.     Luly Phase I:      Luly Phase II:        Anesthesia Post Evaluation    Patient location during evaluation: PACU  Patient participation: waiting for patient participation  Level of consciousness: sleepy but conscious  Pain score: 1  Airway patency: patent  Nausea & Vomiting: no nausea and no vomiting  Complications: no  Cardiovascular status: hemodynamically stable  Respiratory status: acceptable, spontaneous ventilation, face mask and oral airway  Hydration status: stable

## 2022-08-14 NOTE — PLAN OF CARE
Problem: Discharge Planning  Goal: Discharge to home or other facility with appropriate resources  Outcome: Progressing  Flowsheets (Taken 8/13/2022 2008)  Discharge to home or other facility with appropriate resources:   Identify barriers to discharge with patient and caregiver   Identify discharge learning needs (meds, wound care, etc)   Refer to discharge planning if patient needs post-hospital services based on physician order or complex needs related to functional status, cognitive ability or social support system   Arrange for needed discharge resources and transportation as appropriate     Problem: Pain  Goal: Verbalizes/displays adequate comfort level or baseline comfort level  Outcome: Progressing  Flowsheets (Taken 8/13/2022 1951)  Verbalizes/displays adequate comfort level or baseline comfort level:   Encourage patient to monitor pain and request assistance   Administer analgesics based on type and severity of pain and evaluate response   Consider cultural and social influences on pain and pain management   Assess pain using appropriate pain scale   Implement non-pharmacological measures as appropriate and evaluate response   Notify Licensed Independent Practitioner if interventions unsuccessful or patient reports new pain

## 2022-08-14 NOTE — PROGRESS NOTES
4 Eyes Skin Assessment     NAME:  Maudie Klinefelter Petersime  YOB: 1955  MEDICAL RECORD NUMBER:  [id number]    The patient is being assess for  Admission    I agree that 2 RN's have performed a thorough Head to Toe Skin Assessment on the patient. ALL assessment sites listed below have been assessed. Areas assessed by both nurses:    Head, Face, Ears, Shoulders, Back, Chest, Arms, Elbows, Hands, Sacrum. Buttock, Coccyx, Ischium, and Legs. Feet and Heels        Does the Patient have a Wound?  No noted wound(s)       Sha Prevention initiated:  NA   Wound Care Orders initiated:  NA    Pressure Injury (Stage 3,4, Unstageable, DTI, NWPT, and Complex wounds) if present place referral/consult order under [de-identified] NA    New and Established Ostomies if present place consult order under : NA      Nurse 1 eSignature: Electronically signed by Jenifer Cam RN on 8/14/22 at 2:22 AM EDT    **SHARE this note so that the co-signing nurse is able to place an eSignature**    Nurse 2 eSignature: Electronically signed by Alma Rosa Servin RN on 8/14/22 at 2:24 AM EDT

## 2022-08-14 NOTE — PROGRESS NOTES
9319 Patient arrived to PACU, monitors placed and alarms on. Received report from Emilio Tinsley Rn/Michael BURDICK. Patient asleep with oral airway in place. 2251 Patient more wakeful, oral airway removed, maintaining deep breaths and 02 sats. 8347 Patient turned and repositioned. Denies any pain or nausea, patient falls back to sleep. 1000 Patient tolerating ice chips. Report called to [address] [place] [address] Patient denies any complaints. Transported back to room 1128.  at bedside.

## 2022-08-14 NOTE — ANESTHESIA PRE PROCEDURE
(ZOFRAN-ODT) disintegrating tablet 4 mg  4 mg Oral Q8H PRN Louise Coepland MD        Or    ondansetron [facility]) injection 4 mg  4 mg IntraVENous Q6H PRN Louise Copeland MD           Allergies: Allergies   Allergen Reactions    Septra [Sulfamethoxazole-Trimethoprim]        Problem List:    Patient Active Problem List   Diagnosis Code    Chronic venous hypertension with ulcer and inflammation ([facility] I87.339, L97.909    Pain in the abdomen R10.9       Past Medical History:        Diagnosis Date    Chronic venous hypertension with ulcer and inflammation ([facility] 1/29/2013       Past Surgical History:        Procedure Laterality Date    COLONOSCOPY  2003    HYSTERECTOMY (CERVIX STATUS UNKNOWN)  2007    TONSILLECTOMY         Social History:    Social History     Tobacco Use    Smoking status: Never    Smokeless tobacco: Never   Substance Use Topics    Alcohol use: No                                Counseling given: Not Answered      Vital Signs (Current):   Vitals:    08/13/22 1951 08/14/22 0045 08/14/22 0206 08/14/22 0514   BP:  125/79  122/78   Pulse:  89 (!) 102 88   Resp:  19  18   Temp:  99.8 °F (37.7 °C)  100 °F (37.8 °C)   TempSrc:  Oral  Oral   SpO2:  95%  95%   Weight: 202 lb 13.2 oz (92 kg)      Height: 5' 11\" (1.803 m)                                                 BP Readings from Last 3 Encounters:   08/14/22 122/78   03/26/18 (!) 150/85   03/27/15 (!) 140/94       NPO Status: Time of last liquid consumption: 0528 (sips with tylenol)                        Time of last solid consumption: 1830                        Date of last liquid consumption: 08/14/22                        Date of last solid food consumption: 08/13/22    BMI:   Wt Readings from Last 3 Encounters:   08/13/22 202 lb 13.2 oz (92 kg)   03/26/18 206 lb (93.4 kg)   03/27/15 195 lb (88.5 kg)     Body mass index is 28.29 kg/m².     CBC:   Lab Results   Component Value Date/Time    WBC 15.1 08/13/2022 12:28 PM    RBC 4.74 08/13/2022 12:28 PM    HGB 14.6 08/13/2022 12:28 PM    HCT 45.1 08/13/2022 12:28 PM    MCV 95.1 08/13/2022 12:28 PM    RDW 13.6 08/13/2022 12:28 PM     08/13/2022 12:28 PM       CMP:   Lab Results   Component Value Date/Time     08/13/2022 12:28 PM    K 4.1 08/13/2022 12:28 PM    CL 98 08/13/2022 12:28 PM    CO2 27 08/13/2022 12:28 PM    BUN 15 08/13/2022 12:28 PM    CREATININE 0.9 08/13/2022 12:28 PM    GFRAA >60 08/13/2022 12:28 PM    LABGLOM >60 08/13/2022 12:28 PM    GLUCOSE 123 08/13/2022 12:28 PM    PROT 7.7 08/13/2022 12:28 PM    CALCIUM 9.8 08/13/2022 12:28 PM    BILITOT 0.6 08/13/2022 12:28 PM    ALKPHOS 466 08/13/2022 12:28 PM    AST 92 08/13/2022 12:28 PM    ALT 99 08/13/2022 12:28 PM       POC Tests: No results for input(s): POCGLU, POCNA, POCK, POCCL, POCBUN, POCHEMO, POCHCT in the last 72 hours.     Coags: No results found for: PROTIME, INR, APTT    HCG (If Applicable): No results found for: PREGTESTUR, PREGSERUM, HCG, HCGQUANT     ABGs: No results found for: PHART, PO2ART, IWY5HYU, DKV2SKZ, BEART, N5KMWJLQ     Type & Screen (If Applicable):  No results found for: LABABO, LABRH    Drug/Infectious Status (If Applicable):  No results found for: HIV, HEPCAB    COVID-19 Screening (If Applicable):   Lab Results   Component Value Date/Time    COVID19 NOT DETECTED 08/13/2022 12:48 PM           Anesthesia Evaluation  Patient summary reviewed and Nursing notes reviewed no history of anesthetic complications:   Airway: Mallampati: II  TM distance: >3 FB   Neck ROM: full  Mouth opening: > = 3 FB   Dental:    (+) upper dentures and lower dentures      Pulmonary:Negative Pulmonary ROS                              Cardiovascular:  Exercise tolerance: good (>4 METS),            Beta Blocker:  Not on Beta Blocker         Neuro/Psych:               GI/Hepatic/Renal: Neg GI/Hepatic/Renal ROS            Endo/Other: Negative Endo/Other ROS                    Abdominal:             Vascular:   + PVD, aortic or cerebral, . Other Findings:           Anesthesia Plan      general     ASA 2       Induction: intravenous. MIPS: Postoperative opioids intended and Prophylactic antiemetics administered. Anesthetic plan and risks discussed with patient. Plan discussed with CRNA.                     Lucretia Byrd DO   8/14/2022

## 2022-08-15 LAB
HCT VFR BLD CALC: 40.4 % (ref 37–47)
HEMOGLOBIN: 12.8 GM/DL (ref 12.5–16)
MCH RBC QN AUTO: 30.7 PG (ref 27–31)
MCHC RBC AUTO-ENTMCNC: 31.7 % (ref 32–36)
MCV RBC AUTO: 96.9 FL (ref 78–100)
PDW BLD-RTO: 13.9 % (ref 11.7–14.9)
PLATELET # BLD: 330 K/CU MM (ref 140–440)
PMV BLD AUTO: 10.5 FL (ref 7.5–11.1)
RBC # BLD: 4.17 M/CU MM (ref 4.2–5.4)
WBC # BLD: 15.7 K/CU MM (ref 4–10.5)

## 2022-08-15 PROCEDURE — 1200000000 HC SEMI PRIVATE

## 2022-08-15 PROCEDURE — 6360000002 HC RX W HCPCS: Performed by: SURGERY

## 2022-08-15 PROCEDURE — 94761 N-INVAS EAR/PLS OXIMETRY MLT: CPT

## 2022-08-15 PROCEDURE — 85027 COMPLETE CBC AUTOMATED: CPT

## 2022-08-15 PROCEDURE — 6370000000 HC RX 637 (ALT 250 FOR IP): Performed by: SURGERY

## 2022-08-15 PROCEDURE — 2580000003 HC RX 258: Performed by: SURGERY

## 2022-08-15 PROCEDURE — 36415 COLL VENOUS BLD VENIPUNCTURE: CPT

## 2022-08-15 RX ORDER — HYDROCODONE BITARTRATE AND ACETAMINOPHEN 5; 325 MG/1; MG/1
1 TABLET ORAL EVERY 4 HOURS PRN
Status: DISCONTINUED | OUTPATIENT
Start: 2022-08-15 | End: 2022-08-19 | Stop reason: HOSPADM

## 2022-08-15 RX ORDER — DOCUSATE SODIUM 100 MG/1
100 CAPSULE, LIQUID FILLED ORAL DAILY
Status: DISCONTINUED | OUTPATIENT
Start: 2022-08-15 | End: 2022-08-19 | Stop reason: HOSPADM

## 2022-08-15 RX ADMIN — SODIUM CHLORIDE: 9 INJECTION, SOLUTION INTRAVENOUS at 17:36

## 2022-08-15 RX ADMIN — MAGNESIUM HYDROXIDE 30 ML: 400 SUSPENSION ORAL at 09:38

## 2022-08-15 RX ADMIN — SODIUM CHLORIDE, POTASSIUM CHLORIDE, SODIUM LACTATE AND CALCIUM CHLORIDE: 600; 310; 30; 20 INJECTION, SOLUTION INTRAVENOUS at 23:07

## 2022-08-15 RX ADMIN — PIPERACILLIN AND TAZOBACTAM 3375 MG: 3; .375 INJECTION, POWDER, LYOPHILIZED, FOR SOLUTION INTRAVENOUS at 05:45

## 2022-08-15 RX ADMIN — PIPERACILLIN AND TAZOBACTAM 3375 MG: 3; .375 INJECTION, POWDER, LYOPHILIZED, FOR SOLUTION INTRAVENOUS at 23:12

## 2022-08-15 RX ADMIN — PIPERACILLIN AND TAZOBACTAM 3375 MG: 3; .375 INJECTION, POWDER, LYOPHILIZED, FOR SOLUTION INTRAVENOUS at 17:37

## 2022-08-15 RX ADMIN — ENOXAPARIN SODIUM 40 MG: 100 INJECTION SUBCUTANEOUS at 09:38

## 2022-08-15 RX ADMIN — ACETAMINOPHEN 650 MG: 325 TABLET ORAL at 10:07

## 2022-08-15 RX ADMIN — ACETAMINOPHEN 650 MG: 325 TABLET ORAL at 05:46

## 2022-08-15 RX ADMIN — ACETAMINOPHEN 650 MG: 325 TABLET ORAL at 17:23

## 2022-08-15 RX ADMIN — DOCUSATE SODIUM 100 MG: 100 CAPSULE, LIQUID FILLED ORAL at 09:38

## 2022-08-15 RX ADMIN — PIPERACILLIN AND TAZOBACTAM 3375 MG: 3; .375 INJECTION, POWDER, LYOPHILIZED, FOR SOLUTION INTRAVENOUS at 09:37

## 2022-08-15 ASSESSMENT — PAIN DESCRIPTION - LOCATION
LOCATION: HEAD
LOCATION: ABDOMEN

## 2022-08-15 ASSESSMENT — PAIN SCALES - GENERAL
PAINLEVEL_OUTOF10: 5
PAINLEVEL_OUTOF10: 6
PAINLEVEL_OUTOF10: 6
PAINLEVEL_OUTOF10: 3

## 2022-08-15 ASSESSMENT — PAIN DESCRIPTION - ORIENTATION
ORIENTATION: LOWER;RIGHT
ORIENTATION: INNER

## 2022-08-15 ASSESSMENT — PAIN DESCRIPTION - DESCRIPTORS
DESCRIPTORS: ACHING
DESCRIPTORS: ACHING

## 2022-08-15 ASSESSMENT — PAIN - FUNCTIONAL ASSESSMENT: PAIN_FUNCTIONAL_ASSESSMENT: ACTIVITIES ARE NOT PREVENTED

## 2022-08-15 NOTE — CARE COORDINATION
Patient screened for discharge needs and no needs identified at this time. Patient is from home , has PCP and insurance to assist with RX coverage.  Case Management available if needs would arise

## 2022-08-15 NOTE — PROGRESS NOTES
Postop day #1 from laparoscopic drainage of complex abscess and appendectomy patient is doing well and feeling well with minimal pain. She has not passed gas but states she needs milk of magnesia daily due to chronic constipation.   Patient's abdomen is soft the drain is intact draining serosanguineous fluid good urinary output  Full liquid diet  Continue IV antibiotic  Milk of magnesia and stool softeners  Ambulate  CBC in the morning

## 2022-08-16 LAB
HCT VFR BLD CALC: 38.4 % (ref 37–47)
HEMOGLOBIN: 12.1 GM/DL (ref 12.5–16)
MCH RBC QN AUTO: 30.3 PG (ref 27–31)
MCHC RBC AUTO-ENTMCNC: 31.5 % (ref 32–36)
MCV RBC AUTO: 96.2 FL (ref 78–100)
PDW BLD-RTO: 14.1 % (ref 11.7–14.9)
PLATELET # BLD: 379 K/CU MM (ref 140–440)
PMV BLD AUTO: 9.8 FL (ref 7.5–11.1)
RBC # BLD: 3.99 M/CU MM (ref 4.2–5.4)
WBC # BLD: 15.7 K/CU MM (ref 4–10.5)

## 2022-08-16 PROCEDURE — 85027 COMPLETE CBC AUTOMATED: CPT

## 2022-08-16 PROCEDURE — 1200000000 HC SEMI PRIVATE

## 2022-08-16 PROCEDURE — 6360000002 HC RX W HCPCS: Performed by: SURGERY

## 2022-08-16 PROCEDURE — 36415 COLL VENOUS BLD VENIPUNCTURE: CPT

## 2022-08-16 PROCEDURE — 2580000003 HC RX 258: Performed by: SURGERY

## 2022-08-16 PROCEDURE — 6370000000 HC RX 637 (ALT 250 FOR IP): Performed by: SURGERY

## 2022-08-16 PROCEDURE — 94761 N-INVAS EAR/PLS OXIMETRY MLT: CPT

## 2022-08-16 PROCEDURE — 51702 INSERT TEMP BLADDER CATH: CPT

## 2022-08-16 RX ORDER — KETOROLAC TROMETHAMINE 30 MG/ML
15 INJECTION, SOLUTION INTRAMUSCULAR; INTRAVENOUS EVERY 6 HOURS
Status: COMPLETED | OUTPATIENT
Start: 2022-08-16 | End: 2022-08-17

## 2022-08-16 RX ADMIN — MAGNESIUM HYDROXIDE 30 ML: 400 SUSPENSION ORAL at 09:12

## 2022-08-16 RX ADMIN — KETOROLAC TROMETHAMINE 15 MG: 30 INJECTION, SOLUTION INTRAMUSCULAR; INTRAVENOUS at 14:34

## 2022-08-16 RX ADMIN — PIPERACILLIN AND TAZOBACTAM 3375 MG: 3; .375 INJECTION, POWDER, LYOPHILIZED, FOR SOLUTION INTRAVENOUS at 09:15

## 2022-08-16 RX ADMIN — ACETAMINOPHEN 650 MG: 325 TABLET ORAL at 09:09

## 2022-08-16 RX ADMIN — SODIUM CHLORIDE, POTASSIUM CHLORIDE, SODIUM LACTATE AND CALCIUM CHLORIDE: 600; 310; 30; 20 INJECTION, SOLUTION INTRAVENOUS at 14:29

## 2022-08-16 RX ADMIN — MEROPENEM 1000 MG: 1 INJECTION, POWDER, FOR SOLUTION INTRAVENOUS at 18:18

## 2022-08-16 RX ADMIN — SODIUM CHLORIDE 25 ML/HR: 9 INJECTION, SOLUTION INTRAVENOUS at 19:09

## 2022-08-16 RX ADMIN — PIPERACILLIN AND TAZOBACTAM 3375 MG: 3; .375 INJECTION, POWDER, LYOPHILIZED, FOR SOLUTION INTRAVENOUS at 05:23

## 2022-08-16 RX ADMIN — ENOXAPARIN SODIUM 40 MG: 100 INJECTION SUBCUTANEOUS at 09:10

## 2022-08-16 RX ADMIN — VANCOMYCIN HYDROCHLORIDE 2000 MG: 1 INJECTION, POWDER, LYOPHILIZED, FOR SOLUTION INTRAVENOUS at 19:10

## 2022-08-16 RX ADMIN — SODIUM CHLORIDE, POTASSIUM CHLORIDE, SODIUM LACTATE AND CALCIUM CHLORIDE: 600; 310; 30; 20 INJECTION, SOLUTION INTRAVENOUS at 18:13

## 2022-08-16 RX ADMIN — KETOROLAC TROMETHAMINE 15 MG: 30 INJECTION, SOLUTION INTRAMUSCULAR; INTRAVENOUS at 21:12

## 2022-08-16 RX ADMIN — ACETAMINOPHEN 650 MG: 325 TABLET ORAL at 21:11

## 2022-08-16 ASSESSMENT — PAIN SCALES - GENERAL
PAINLEVEL_OUTOF10: 0
PAINLEVEL_OUTOF10: 5
PAINLEVEL_OUTOF10: 10

## 2022-08-16 ASSESSMENT — PAIN DESCRIPTION - ORIENTATION: ORIENTATION: RIGHT

## 2022-08-16 ASSESSMENT — PAIN DESCRIPTION - DESCRIPTORS
DESCRIPTORS: ACHING
DESCRIPTORS: ACHING

## 2022-08-16 ASSESSMENT — PAIN DESCRIPTION - LOCATION
LOCATION: HEAD
LOCATION: BACK;HEAD

## 2022-08-16 NOTE — PROGRESS NOTES
Progress Note    Subjective:      Dominic Soulier Petersime   Patient required a Pope catheter this morning for urinary retention. The patient clinically is doing okay without specific complaints she has had one bowel movement. She still has a fever of 100 and a white count of 15,001 blood culture did grow coagulase positive staph. Given these findings of persistent leukocytosis low-grade fever and positive blood culture I am switching her antibiotics from Zosyn to Merrem and vancomycin.   Objective:     BP (!) 151/83   Pulse 91   Temp 98.7 °F (37.1 °C) (Oral)   Resp 17   Ht 5' 11\" (1.803 m)   Wt 211 lb 13.8 oz (96.1 kg)   SpO2 96%   BMI 29.55 kg/m²     In: -   Out: 650 [Urine:650]         General: Awake and alert complains of some right-sided back pain  Abdomen: Soft minimally tender drain output minimal old blood  Lungs: Clear  Other: Pope catheter in place with clear urine    Labs:   CBC:   Lab Results   Component Value Date/Time    WBC 15.7 08/16/2022 06:18 AM    RBC 3.99 08/16/2022 06:18 AM    HGB 12.1 08/16/2022 06:18 AM    HCT 38.4 08/16/2022 06:18 AM    MCV 96.2 08/16/2022 06:18 AM    MCH 30.3 08/16/2022 06:18 AM    MCHC 31.5 08/16/2022 06:18 AM    RDW 14.1 08/16/2022 06:18 AM     08/16/2022 06:18 AM    MPV 9.8 08/16/2022 06:18 AM        Assessment:      Perforated appendicitis with complex abscess  Bacteremia staph  Urinary retention     Plan:   As above  Continuous IV fluid  Change antibiotic coverage  Pharmacy to manage vancomycin  Repeat CBC  May require repeat CAT scan if patient does not clinically improve

## 2022-08-16 NOTE — PROGRESS NOTES
[address] [facility]  consulted by Dr. Cynthia Joseph for monitoring and adjustment. Indication for treatment: Perforated appendicitis, possible bacteremia  Goal trough: [] 10-15 mcg/mL or [x] 15-20 mcg/ml  AUC/EVER: [] <500 or [x] 400-600    Pertinent Laboratory Values:   Temp Readings from Last 3 Encounters:   08/16/22 98.2 °F (36.8 °C) (Oral)   03/26/18 97.8 °F (36.6 °C) (Oral)   04/30/13 98 °F (36.7 °C) (Temporal)     Recent Labs     08/15/22  0613 08/16/22  0618   WBC 15.7* 15.7*     No results for input(s): BUN, CREATININE in the last 72 hours. Estimated Creatinine Clearance: 75 mL/min (based on SCr of 0.9 mg/dL). Intake/Output Summary (Last 24 hours) at 8/16/2022 1513  Last data filed at 8/16/2022 1338  Gross per 24 hour   Intake --   Output 650 ml   Net -650 ml       Pertinent Cultures:  Date    Source    Results  8/13   Blood    Staph sp. 1/4 8/13   Covid-19 PCR   Negative    Vancomycin level:   TROUGH:  No results for input(s): VANCOTROUGH in the last 72 hours. RANDOM:  No results for input(s): VANCORANDOM in the last 72 hours.     Assessment:  SCr, BUN, and urine output:   Scr WNL @ 0.9  Plan to repeat renal labs tomorrow AM  Day(s) of therapy: 1  Vancomycin concentration: to be collected    Plan:  Vancomycin 2000 mg IVPB x1 loading dose, followed by 1500 mg IVPB q18h  Predicted trough: 16.6,   Plan to collect a level in 48h  Plan to collect renal labs tomorrow AM  Pharmacy will continue to monitor patient and adjust therapy as indicated    Nam 3 8/18 @ 0600    Thank you for the consult,  Indy Arroyo, [facility], PharmD  8/16/2022 3:13 PM

## 2022-08-17 LAB
ANION GAP SERPL CALCULATED.3IONS-SCNC: 11 MMOL/L (ref 4–16)
BUN BLDV-MCNC: 7 MG/DL (ref 6–23)
CALCIUM SERPL-MCNC: 8.7 MG/DL (ref 8.3–10.6)
CHLORIDE BLD-SCNC: 102 MMOL/L (ref 99–110)
CO2: 30 MMOL/L (ref 21–32)
CREAT SERPL-MCNC: 0.8 MG/DL (ref 0.6–1.1)
CULTURE: ABNORMAL
CULTURE: ABNORMAL
GFR AFRICAN AMERICAN: >60 ML/MIN/1.73M2
GFR NON-AFRICAN AMERICAN: >60 ML/MIN/1.73M2
GLUCOSE BLD-MCNC: 96 MG/DL (ref 70–99)
HCT VFR BLD CALC: 38 % (ref 37–47)
HEMOGLOBIN: 12 GM/DL (ref 12.5–16)
Lab: ABNORMAL
MCH RBC QN AUTO: 30.2 PG (ref 27–31)
MCHC RBC AUTO-ENTMCNC: 31.6 % (ref 32–36)
MCV RBC AUTO: 95.7 FL (ref 78–100)
PDW BLD-RTO: 14.2 % (ref 11.7–14.9)
PLATELET # BLD: 362 K/CU MM (ref 140–440)
PMV BLD AUTO: 10.1 FL (ref 7.5–11.1)
POTASSIUM SERPL-SCNC: 4.1 MMOL/L (ref 3.5–5.1)
RBC # BLD: 3.97 M/CU MM (ref 4.2–5.4)
SODIUM BLD-SCNC: 143 MMOL/L (ref 135–145)
SPECIMEN: ABNORMAL
WBC # BLD: 12.8 K/CU MM (ref 4–10.5)

## 2022-08-17 PROCEDURE — 6370000000 HC RX 637 (ALT 250 FOR IP): Performed by: SURGERY

## 2022-08-17 PROCEDURE — 85027 COMPLETE CBC AUTOMATED: CPT

## 2022-08-17 PROCEDURE — 6360000002 HC RX W HCPCS: Performed by: SURGERY

## 2022-08-17 PROCEDURE — 2580000003 HC RX 258: Performed by: SURGERY

## 2022-08-17 PROCEDURE — 1200000000 HC SEMI PRIVATE

## 2022-08-17 PROCEDURE — 80048 BASIC METABOLIC PNL TOTAL CA: CPT

## 2022-08-17 PROCEDURE — 36415 COLL VENOUS BLD VENIPUNCTURE: CPT

## 2022-08-17 RX ADMIN — ENOXAPARIN SODIUM 40 MG: 100 INJECTION SUBCUTANEOUS at 10:03

## 2022-08-17 RX ADMIN — SODIUM CHLORIDE, POTASSIUM CHLORIDE, SODIUM LACTATE AND CALCIUM CHLORIDE: 600; 310; 30; 20 INJECTION, SOLUTION INTRAVENOUS at 14:20

## 2022-08-17 RX ADMIN — MEROPENEM 1000 MG: 1 INJECTION, POWDER, FOR SOLUTION INTRAVENOUS at 10:08

## 2022-08-17 RX ADMIN — ACETAMINOPHEN 650 MG: 325 TABLET ORAL at 05:37

## 2022-08-17 RX ADMIN — MEROPENEM 1000 MG: 1 INJECTION, POWDER, FOR SOLUTION INTRAVENOUS at 01:15

## 2022-08-17 RX ADMIN — VANCOMYCIN HYDROCHLORIDE 1500 MG: 5 INJECTION, POWDER, LYOPHILIZED, FOR SOLUTION INTRAVENOUS at 10:45

## 2022-08-17 RX ADMIN — KETOROLAC TROMETHAMINE 15 MG: 30 INJECTION, SOLUTION INTRAMUSCULAR; INTRAVENOUS at 10:29

## 2022-08-17 RX ADMIN — DOCUSATE SODIUM 100 MG: 100 CAPSULE, LIQUID FILLED ORAL at 10:03

## 2022-08-17 RX ADMIN — SODIUM CHLORIDE, POTASSIUM CHLORIDE, SODIUM LACTATE AND CALCIUM CHLORIDE: 600; 310; 30; 20 INJECTION, SOLUTION INTRAVENOUS at 01:13

## 2022-08-17 RX ADMIN — MEROPENEM 1000 MG: 1 INJECTION, POWDER, FOR SOLUTION INTRAVENOUS at 17:44

## 2022-08-17 RX ADMIN — ACETAMINOPHEN 650 MG: 325 TABLET ORAL at 01:47

## 2022-08-17 RX ADMIN — KETOROLAC TROMETHAMINE 15 MG: 30 INJECTION, SOLUTION INTRAMUSCULAR; INTRAVENOUS at 01:17

## 2022-08-17 ASSESSMENT — PAIN SCALES - GENERAL
PAINLEVEL_OUTOF10: 0
PAINLEVEL_OUTOF10: 3

## 2022-08-17 ASSESSMENT — PAIN DESCRIPTION - DESCRIPTORS: DESCRIPTORS: ACHING

## 2022-08-17 ASSESSMENT — PAIN DESCRIPTION - LOCATION: LOCATION: HEAD

## 2022-08-17 ASSESSMENT — PAIN - FUNCTIONAL ASSESSMENT: PAIN_FUNCTIONAL_ASSESSMENT: ACTIVITIES ARE NOT PREVENTED

## 2022-08-17 ASSESSMENT — PAIN DESCRIPTION - FREQUENCY: FREQUENCY: INTERMITTENT

## 2022-08-17 ASSESSMENT — PAIN DESCRIPTION - PAIN TYPE: TYPE: ACUTE PAIN

## 2022-08-17 NOTE — PROGRESS NOTES
[address] [facility]  consulted by Dr. Carlos Manuel Knapp for monitoring and adjustment. Indication for treatment: Perforated appendicitis, possible bacteremia  Goal trough: [] 10-15 mcg/mL or [x] 15-20 mcg/ml  AUC/EVER: [] <500 or [x] 400-600    Pertinent Laboratory Values:   Temp Readings from Last 3 Encounters:   08/17/22 98.8 °F (37.1 °C) (Oral)   03/26/18 97.8 °F (36.6 °C) (Oral)   04/30/13 98 °F (36.7 °C) (Temporal)     Recent Labs     08/15/22  0613 08/16/22  0618 08/17/22  0419   WBC 15.7* 15.7* 12.8*       Recent Labs     08/17/22  0419   BUN 7   CREATININE 0.8     Estimated Creatinine Clearance: 86 mL/min (based on SCr of 0.8 mg/dL). Intake/Output Summary (Last 24 hours) at 8/17/2022 1402  Last data filed at 8/17/2022 0501  Gross per 24 hour   Intake --   Output 1450 ml   Net -1450 ml       Pertinent Cultures:  Date    Source    Results  8/13   Blood    Staph sp. 1/4 8/13   Covid-19 PCR   Negative    Vancomycin level:   TROUGH:  No results for input(s): VANCOTROUGH in the last 72 hours. RANDOM:  No results for input(s): VANCORANDOM in the last 72 hours.     Assessment:  SCr, BUN, and urine output:   Scr WNL @ 0.8              * I/O as documented  Day(s) of therapy: 2  Vancomycin concentration: to be collected    Plan:  CONTINUE SAME DOSE AND FREQUENCY = Vancomycin 1500 mg IVPB q18h  Predicted trough: 15.2,   Plan to collect level in am  Plan to collect renal labs tomorrow AM  Pharmacy will continue to monitor patient and adjust therapy as indicated    Nam 3 8/18 @ 0600    Thank you for the consult,  Catarino Peace [facility], PharmD  8/17/2022 2:02 PM

## 2022-08-17 NOTE — PROGRESS NOTES
Progress Note    Subjective:      Nely Guillaume   The patient claims she is feeling better today. She continues to have a daily fever of 100 though her white count is down from 15,000-12,000. She was changed to vancomycin and meropenem for IV antibiotic coverage given that she grew staph from the blood. She is not complaining of abdominal pain. Objective:     BP (!) 154/84   Pulse 79   Temp 98.5 °F (36.9 °C) (Oral)   Resp 18   Ht 5' 11\" (1.803 m)   Wt 220 lb 7.4 oz (100 kg)   SpO2 95%   BMI 30.75 kg/m²     In: -   Out: 2100 [Urine:2100]         General: Well appearing no acute distress  Abdomen: Flat soft drain intact with old clotted blood  Lungs: Clear  Other: Pope catheter in place with clear urine increased urinary output    Labs:   CBC:   Lab Results   Component Value Date/Time    WBC 12.8 08/17/2022 04:19 AM    RBC 3.97 08/17/2022 04:19 AM    HGB 12.0 08/17/2022 04:19 AM    HCT 38.0 08/17/2022 04:19 AM    MCV 95.7 08/17/2022 04:19 AM    MCH 30.2 08/17/2022 04:19 AM    MCHC 31.6 08/17/2022 04:19 AM    RDW 14.2 08/17/2022 04:19 AM     08/17/2022 04:19 AM    MPV 10.1 08/17/2022 04:19 AM        Assessment:      Perforated appendicitis with complex abscess  2. Staph bacteremia  3. Postop urinary retention     Plan:   Continue with IV antibiotics and present care. If patient continues to have an elevated white count and fever we will repeat CT scan of the abdomen and pelvis to assess the drainage of the abscess.   Drain to be removed today  The Pope catheter  Plan of care discussed with patient

## 2022-08-18 LAB
CULTURE: NORMAL
DOSE AMOUNT: NORMAL
DOSE TIME: NORMAL
Lab: NORMAL
SPECIMEN: NORMAL
VANCOMYCIN RANDOM: 34.6 UG/ML

## 2022-08-18 PROCEDURE — 94761 N-INVAS EAR/PLS OXIMETRY MLT: CPT

## 2022-08-18 PROCEDURE — 2580000003 HC RX 258: Performed by: SURGERY

## 2022-08-18 PROCEDURE — 51702 INSERT TEMP BLADDER CATH: CPT

## 2022-08-18 PROCEDURE — 80202 ASSAY OF VANCOMYCIN: CPT

## 2022-08-18 PROCEDURE — 36415 COLL VENOUS BLD VENIPUNCTURE: CPT

## 2022-08-18 PROCEDURE — 6360000002 HC RX W HCPCS: Performed by: SURGERY

## 2022-08-18 PROCEDURE — 6370000000 HC RX 637 (ALT 250 FOR IP): Performed by: SURGERY

## 2022-08-18 PROCEDURE — 1200000000 HC SEMI PRIVATE

## 2022-08-18 RX ADMIN — SODIUM CHLORIDE, PRESERVATIVE FREE 10 ML: 5 INJECTION INTRAVENOUS at 21:53

## 2022-08-18 RX ADMIN — MEROPENEM 1000 MG: 1 INJECTION, POWDER, FOR SOLUTION INTRAVENOUS at 18:50

## 2022-08-18 RX ADMIN — DOCUSATE SODIUM 100 MG: 100 CAPSULE, LIQUID FILLED ORAL at 08:41

## 2022-08-18 RX ADMIN — SODIUM CHLORIDE, POTASSIUM CHLORIDE, SODIUM LACTATE AND CALCIUM CHLORIDE: 600; 310; 30; 20 INJECTION, SOLUTION INTRAVENOUS at 08:45

## 2022-08-18 RX ADMIN — SODIUM CHLORIDE, PRESERVATIVE FREE 10 ML: 5 INJECTION INTRAVENOUS at 08:42

## 2022-08-18 RX ADMIN — SODIUM CHLORIDE: 9 INJECTION, SOLUTION INTRAVENOUS at 10:35

## 2022-08-18 RX ADMIN — VANCOMYCIN HYDROCHLORIDE 1500 MG: 5 INJECTION, POWDER, LYOPHILIZED, FOR SOLUTION INTRAVENOUS at 05:14

## 2022-08-18 RX ADMIN — MEROPENEM 1000 MG: 1 INJECTION, POWDER, FOR SOLUTION INTRAVENOUS at 10:38

## 2022-08-18 RX ADMIN — ACETAMINOPHEN 650 MG: 325 TABLET ORAL at 18:47

## 2022-08-18 RX ADMIN — MEROPENEM 1000 MG: 1 INJECTION, POWDER, FOR SOLUTION INTRAVENOUS at 02:56

## 2022-08-18 NOTE — PROGRESS NOTES
[facility]  consulted by Dr. Alvaro Epperson for monitoring and adjustment. Indication for treatment: Perforated appendicitis, possible bacteremia  Goal trough: [] 10-15 mcg/mL or [x] 15-20 mcg/ml  AUC/EVER: [] <500 or [x] 400-600    Pertinent Laboratory Values:   Temp Readings from Last 3 Encounters:   08/18/22 98.9 °F (37.2 °C) (Oral)   03/26/18 97.8 °F (36.6 °C) (Oral)   04/30/13 98 °F (36.7 °C) (Temporal)     Recent Labs     08/16/22  0618 08/17/22  0419   WBC 15.7* 12.8*     Recent Labs     08/17/22  0419   BUN 7   CREATININE 0.8     Estimated Creatinine Clearance: 86 mL/min (based on SCr of 0.8 mg/dL). Intake/Output Summary (Last 24 hours) at 8/18/2022 0810  Last data filed at 8/18/2022 0433  Gross per 24 hour   Intake --   Output 3400 ml   Net -3400 ml     Pertinent Cultures:  Date    Source    Results  8/13   Blood    Staph sp. 1/4 8/13   Covid-19 PCR   Negative    Vancomycin level:   TROUGH:  No results for input(s): VANCOTROUGH in the last 72 hours.   RANDOM:    Recent Labs     08/18/22  0551   VANCORANDOM 34.6       Assessment:  SCr, BUN, and urine output:   Scr WNL @ 0.8   I/O as documented  Day(s) of therapy: 3  Vancomycin concentration:   8/18 = 34.6 ()    Plan:  Adjust dose from Vancomycin 1500 mg IVPB q18h to 1500 mg IVPB q24h  Predicted   Pharmacy will continue to monitor patient and adjust therapy as indicated    VANCOMYCIN CONCENTRATION SCHEDULED FOR 8/20 @ 0600    Thank you for the consult,  Conchis Gonzales [facility], PharmD  8/18/2022 8:10 AM

## 2022-08-18 NOTE — PROGRESS NOTES
Day #5 from a laparoscopic drainage of abscess from perforated appendicitis  Patient is feeling much better today wants to eat and go home  Temperature is down to 98  Abdomen soft nontender  Excellent urinary output  CBC pending  Hep-Lock IV  Continue IV antibiotics  Advance to regular diet  Remove Pope cath  Possible discharge tomorrow if doing well

## 2022-08-19 VITALS
HEIGHT: 71 IN | BODY MASS INDEX: 30.86 KG/M2 | OXYGEN SATURATION: 97 % | DIASTOLIC BLOOD PRESSURE: 108 MMHG | SYSTOLIC BLOOD PRESSURE: 162 MMHG | WEIGHT: 220.46 LBS | TEMPERATURE: 97.7 F | RESPIRATION RATE: 16 BRPM | HEART RATE: 84 BPM

## 2022-08-19 PROCEDURE — 6360000002 HC RX W HCPCS: Performed by: SURGERY

## 2022-08-19 PROCEDURE — 6370000000 HC RX 637 (ALT 250 FOR IP): Performed by: SURGERY

## 2022-08-19 PROCEDURE — 2580000003 HC RX 258: Performed by: SURGERY

## 2022-08-19 RX ORDER — AMOXICILLIN AND CLAVULANATE POTASSIUM 875; 125 MG/1; MG/1
1 TABLET, FILM COATED ORAL 2 TIMES DAILY
Qty: 14 TABLET | Refills: 0 | Status: SHIPPED | OUTPATIENT
Start: 2022-08-19 | End: 2022-08-26

## 2022-08-19 RX ADMIN — ACETAMINOPHEN 650 MG: 325 TABLET ORAL at 07:48

## 2022-08-19 RX ADMIN — SODIUM CHLORIDE 25 ML/HR: 9 INJECTION, SOLUTION INTRAVENOUS at 01:33

## 2022-08-19 RX ADMIN — MEROPENEM 1000 MG: 1 INJECTION, POWDER, FOR SOLUTION INTRAVENOUS at 01:35

## 2022-08-19 RX ADMIN — VANCOMYCIN HYDROCHLORIDE 1500 MG: 5 INJECTION, POWDER, LYOPHILIZED, FOR SOLUTION INTRAVENOUS at 04:15

## 2022-08-19 ASSESSMENT — PAIN DESCRIPTION - LOCATION: LOCATION: HEAD

## 2022-08-19 ASSESSMENT — PAIN SCALES - GENERAL: PAINLEVEL_OUTOF10: 10

## 2022-08-19 NOTE — PROGRESS NOTES
D/c instructions reviewed with pt. Pt dressed ready for d/c. Waiting on ride.  aware of pt d/c.  Pt states he will be here around 0900

## 2022-08-22 ENCOUNTER — CARE COORDINATION (OUTPATIENT)
Dept: CASE MANAGEMENT | Age: 67
End: 2022-08-22

## 2022-08-22 DIAGNOSIS — R10.84 GENERALIZED ABDOMINAL PAIN: Primary | ICD-10-CM

## 2022-08-22 PROCEDURE — 1111F DSCHRG MED/CURRENT MED MERGE: CPT | Performed by: FAMILY MEDICINE

## 2022-08-22 NOTE — CARE COORDINATION
[facility]    Call within 2 business days of discharge: Yes    Patient: Jaime Gould Patient : 1955   MRN: [id number]  Reason for Admission: Perforated appendix  Discharge Date: 22 RARS: Readmission Risk Score: 6      Last Discharge [address] [address]       Date Complaint Diagnosis Description Type Department Provider    22 Other; Abdominal Pain Perforated appendix . .. ED to Hosp-Admission (Discharged) (ADMITTED) Malen Dakins, MD; Lauryn Wright. .. Transitions of Care Initial Call:  Explained the role of Care Transition Nurse and the Transition program, patient is agreeable to follow up calls Post discharge from the [facility] 664 and spoke with patient. Patient states she is feeling well. Denies pain, swelling, cough, SOB. Incision covered by dressing - Clean Dry and intact. Reviewed medications with Patient. Taking  mg and Augmentin as directed. Confirmed Patient obtained and is taking medications as directed. There are no questions concerning medications at this time. Stressed importance of medication compliance. 1111F Medication Reconciliation completed. Routed to PCP. Reviewed appointments with patient. Has appointment to see Surgeon Dr. Rita Funes 2022. Instructed patient that this is the Final Transition Call and to call their Specialist/PCP for any problems or issues that may occur. Expresses understanding. Chayo Torres LPN    [phone]  [facility] / [place] [address] Coordinator  Was this an external facility discharge? No Discharge Facility:     Challenges to be reviewed by the provider   Additional needs identified to be addressed with provider No  none             Method of communication with provider : none      Advance Care Planning:   Does patient have an Advance Directive:  reviewed and current. Was this a readmission?  No  Patient stated reason for admission: Perforated appendix  Patients top risk factors for readmission: lack of knowledge about disease  medical condition-Perforated appendix  medication management    Care Transition Nurse (CTN) contacted the patient by telephone to perform post hospital discharge assessment. Verified name and  with patient as identifiers. Provided introduction to self, and explanation of the CTN role. CTN reviewed discharge instructions, medical action plan and red flags with patient who verbalized understanding. Patient given an opportunity to ask questions and does not have any further questions or concerns at this time. Were discharge instructions available to patient? Yes. Reviewed appropriate site of care based on symptoms and resources available to patient including: Specialist  When to call 911. The patient agrees to contact the PCP office for questions related to their healthcare. Medication reconciliation was performed with patient, who verbalizes understanding of administration of home medications. Advised obtaining a 90-day supply of all daily and as-needed medications. Reviewed and educated patient on any new and changed medications related to discharge diagnosis     Was patient discharged with a pulse oximeter? No If Yes - Discussed and confirmed pulse oximeter discharge instructions and when to notify provider or seek emergency care. LPN CC provided contact information. No further follow-up call identified based on severity of symptoms and risk factors.        Non-face-to-face services provided:  Obtained and reviewed discharge summary and/or continuity of care documents    Care Transitions 24 Hour Call    Schedule Follow Up Appointment with PCP: Completed  Do you have a copy of your discharge instructions?: Yes  Do you have all of your prescriptions and are they filled?: Yes  Have you been contacted by a [id number] [employer] Pharmacist?: No  Have you scheduled your follow up appointment?: Yes  How are you going to get to your appointment?: Car - family or friend to transport  Do you feel like you have everything you need to keep you well at home?: Yes  Care Transitions Interventions  No Identified Needs         Follow Up  No future appointments.     Werner Tam LPN

## 2022-08-24 NOTE — DISCHARGE SUMMARY
[address] [place] [address], [address] [place] [address]                               DISCHARGE SUMMARY    PATIENT NAME: Benjamin Anand                :        1955  MED REC NO:   [id number]                          ROOM:       1143  ACCOUNT NO:   [de-identified]                           ADMIT DATE: 2022  PROVIDER:     Yo Baumann MD                  DISCHARGE DATE:  2022    DISCHARGE DIAGNOSIS:  Perforated appendicitis with abscess. PROCEDURES PERFORMED:  Laparoscopic drainage of complex abscess and  perforated appendicitis with drain placement. DISPOSITION AND FOLLOWUP:  The patient was discharged home in a good  condition. DISCHARGE MEDICATIONS:  She will resume her prehospitalization  medications including aspirin 325 mg a day, she was given Norco for  pain. She was also discharged home on oral antibiotic therapy. Her diet was regular diet as tolerated. She will be seen back in the office in 1 week for followup. She was discharged home on Augmentin 875 mg p.o. b.i.d. for 7 days. CONDITION ON DISCHARGE:  Good. HISTORY OF PRESENT ILLNESS AND HOSPITAL COURSE:  This patient presented  to the emergency room with approximately 5-7 days of lower abdominal  pain. Evaluation showed evidence of remotely perforated appendicitis  with complex pericecal abscess. I came to the emergency room and  evaluated the patient who was not septic. She received broad-spectrum  antibiotics, IV fluids. She was then taken to surgery the following day  for laparoscopic drainage of the abscess and drain placement. Her  postoperative course, she did have a elevated white count and fever for  several days. Her antibiotics were switched to meropenem. Drain, which  was placed at time of surgery was left intact and drained daily. Eventually, the drain was removed. The patient's bowel function  returned. Her diet was advanced. Her white count returned to near  normal and her temperature returned to normal.  AT the time of the  patient's discharge, she was in good condition, given all her discharge  instructions, and will be seen back in the office in 1 week.         Shannan Armenta MD    D: 08/24/2022 13:04:12       T: 08/24/2022 13:06:35     [email or web address]  Job#: [id number]     Doc#: [id number]    CC:

## 2022-09-19 NOTE — PROGRESS NOTES
Physician Progress Note      Dea Mallory  CSN #:                  [id number]  :                       1955  ADMIT DATE:       2022 11:51 AM  DISCH DATE:        2022 9:24 AM  RESPONDING  PROVIDER #:        Herb Greenwood MD          QUERY TEXT:    Pt admitted with appendicitis. Pt noted to have fever and leukocytosis. If   possible, please document in the progress notes and discharge summary if you   are evaluating and /or treating any of the following: The medical record reflects the following:  Risk Factors: appendicitis with perforation and abscess  Clinical Indicators: VS post-op: TMAX 100.9, P 70's-80's, RR 18-20, SBP   130's-150's, SpO2 95-98% on RA. WBC 15.1 on  and increased to 15.7 on   8/15. Discharge Summary stated, \"I came to the emergency room and evaluated   the patient who was not septic. Her postoperative course, she did have a   elevated white count and fever for several days. Her antibiotics were   switched to meropenem. Drain, which was placed at time of surgery was left   intact and drained daily. Eventually, the drain was removed. The patient's   bowel function returned. Her diet was advanced. Her white co  Treatment: IV ABX, surgical removal of appendix, labs, imaging    Thank you,  Vicki Umaña, RN  [phone]  Options provided:  -- Sepsis, not present on admission  -- Abdominal abscess without Sepsis  -- Post-op sepsis related to surgery  -- Sepsis was ruled out  -- Other - I will add my own diagnosis  -- Disagree - Not applicable / Not valid  -- Disagree - Clinically unable to determine / Unknown  -- Refer to Clinical Documentation Reviewer    PROVIDER RESPONSE TEXT:    This patient has sepsis that was not present on admission.     Query created by: Randee Duverney on 2022 8:44 AM      Electronically signed by:  Herb Greenwood MD 2022 8:00 AM

## (undated) DEVICE — SPONGE DRN W4XL4IN RAYON/POLYESTER 6 PLY NONWOVEN PRECUT

## (undated) DEVICE — SCISSORS ENDOSCP DIA5MM CRV MPLR CAUT W/ RATCH HNDL

## (undated) DEVICE — STERILE LATEX POWDER-FREE SURGICAL GLOVESWITH NITRILE COATING: Brand: PROTEXIS

## (undated) DEVICE — TOTAL TRAY, DB, 100% SILI FOLEY, 16FR 10: Brand: MEDLINE

## (undated) DEVICE — PACK SURG LAP CHOLE

## (undated) DEVICE — TROCAR: Brand: KII FIOS FIRST ENTRY

## (undated) DEVICE — GLOVE ORANGE PI 8   MSG9080

## (undated) DEVICE — GLOVE SURG SZ 6 THK91MIL LTX FREE SYN POLYISOPRENE ANTI

## (undated) DEVICE — SEALER TISSUE X1 37CM STRAIGHT JAW

## (undated) DEVICE — SUTURE PROL SZ 5-0 L18IN NONABSORBABLE BLU L19MM PC-5 3/8 8630G

## (undated) DEVICE — NEEDLE INSUF L120MM DIA2MM DISP FOR PNEUMOPERI ENDOPATH

## (undated) DEVICE — Device

## (undated) DEVICE — TISSUE RETRIEVAL SYSTEM: Brand: INZII RETRIEVAL SYSTEM

## (undated) DEVICE — GOWN,ECLIPSE,POLYRNF,BRTHSLV,L,30/CS: Brand: MEDLINE

## (undated) DEVICE — TOWEL,OR,DSP,ST,BLUE,STD,6/PK,12PK/CS: Brand: MEDLINE

## (undated) DEVICE — GLOVE SURG SZ 65 THK91MIL LTX FREE SYN POLYISOPRENE

## (undated) DEVICE — GLOVE SURG SZ 65 L12IN FNGR THK79MIL GRN LTX FREE

## (undated) DEVICE — SET TBNG DISP TIP FOR AHTO

## (undated) DEVICE — SUTURE PERMA-HAND BLACK  2/0 18IN  FS 685H

## (undated) DEVICE — RESERVOIR,SUCTION,100CC,SILICONE: Brand: MEDLINE

## (undated) DEVICE — NEEDLE HYPO 20GA L1.5IN YEL POLYPR HUB S STL REG BVL STR

## (undated) DEVICE — SYRINGE MED 30ML STD CLR PLAS LUERLOCK TIP N CTRL DISP

## (undated) DEVICE — SOLUTION IV 1000ML 0.9% SOD CHL FOR IRRIG PLAS CONT

## (undated) DEVICE — APPLICATOR MEDICATED 26 CC SOLUTION HI LT ORNG CHLORAPREP

## (undated) DEVICE — TUBING INSUFFLATOR HEAT HI FLO SET PNEUMOCLEAR

## (undated) DEVICE — SOLUTION IRRIG 1000ML 0.9% SOD CHL USP POUR PLAS BTL

## (undated) DEVICE — SUTURE SZ 0 27IN 5/8 CIR UR-6  TAPER PT VIOLET ABSRB VICRYL J603H

## (undated) DEVICE — DRESSING TRNSPAR W2XL2.75IN FLM SHT SEMIPERMEABLE WIND

## (undated) DEVICE — DRAIN SURG 15FR SIL SMOOTH RND 1/8IN END PERF W/ TRCR RADPQ

## (undated) DEVICE — GOWN,SIRUS,POLYRNF,BRTHSLV,XLN/XL,20/CS: Brand: MEDLINE

## (undated) DEVICE — TROCAR: Brand: KII® SLEEVE